# Patient Record
Sex: MALE | Race: WHITE | NOT HISPANIC OR LATINO | Employment: UNEMPLOYED | URBAN - METROPOLITAN AREA
[De-identification: names, ages, dates, MRNs, and addresses within clinical notes are randomized per-mention and may not be internally consistent; named-entity substitution may affect disease eponyms.]

---

## 2023-10-17 DIAGNOSIS — I12.9 BENIGN HYPERTENSION WITH CHRONIC KIDNEY DISEASE: Primary | ICD-10-CM

## 2023-10-17 PROBLEM — I10 ESSENTIAL HYPERTENSION: Status: ACTIVE | Noted: 2023-10-17

## 2023-10-17 PROBLEM — K21.9 GASTRO-ESOPHAGEAL REFLUX DISEASE WITHOUT ESOPHAGITIS: Status: ACTIVE | Noted: 2023-10-17

## 2023-10-17 PROBLEM — Z86.39 HISTORY OF UNCONTROLLED DIABETES: Status: ACTIVE | Noted: 2023-10-17

## 2023-10-17 PROBLEM — E11.9 TYPE 2 DIABETES MELLITUS WITHOUT COMPLICATION (MULTI): Status: ACTIVE | Noted: 2023-10-17

## 2023-10-17 PROBLEM — B02.29 POSTHERPETIC NEURALGIA: Status: ACTIVE | Noted: 2023-10-17

## 2023-10-17 PROBLEM — I48.92 ATRIAL FLUTTER (MULTI): Status: ACTIVE | Noted: 2023-10-17

## 2023-10-17 PROBLEM — I25.10 ATHEROSCLEROTIC HEART DISEASE OF NATIVE CORONARY ARTERY WITHOUT ANGINA PECTORIS: Status: ACTIVE | Noted: 2023-10-17

## 2023-10-17 PROBLEM — E78.5 HYPERLIPIDEMIA: Status: ACTIVE | Noted: 2023-10-17

## 2023-10-17 RX ORDER — LOSARTAN POTASSIUM 100 MG/1
100 TABLET ORAL DAILY
COMMUNITY
End: 2023-10-30 | Stop reason: SDUPTHER

## 2023-10-17 RX ORDER — PANTOPRAZOLE SODIUM 20 MG/1
20 TABLET, DELAYED RELEASE ORAL DAILY
COMMUNITY
End: 2023-11-08

## 2023-10-17 RX ORDER — DULOXETIN HYDROCHLORIDE 60 MG/1
60 CAPSULE, DELAYED RELEASE ORAL
COMMUNITY
End: 2023-10-30 | Stop reason: SDUPTHER

## 2023-10-17 RX ORDER — POTASSIUM CHLORIDE 20 MEQ/1
20 TABLET, EXTENDED RELEASE ORAL 2 TIMES DAILY
COMMUNITY
End: 2024-03-22 | Stop reason: SDUPTHER

## 2023-10-17 RX ORDER — CHLORTHALIDONE 25 MG/1
25 TABLET ORAL EVERY MORNING
COMMUNITY
End: 2023-10-17 | Stop reason: SDUPTHER

## 2023-10-17 RX ORDER — DULOXETIN HYDROCHLORIDE 30 MG/1
30 CAPSULE, DELAYED RELEASE ORAL
COMMUNITY
End: 2023-10-30 | Stop reason: SDUPTHER

## 2023-10-17 RX ORDER — PIOGLITAZONEHYDROCHLORIDE 15 MG/1
15 TABLET ORAL DAILY
COMMUNITY
End: 2023-11-08

## 2023-10-17 RX ORDER — BUPROPION HYDROCHLORIDE 150 MG/1
150 TABLET ORAL DAILY
COMMUNITY
End: 2023-12-26 | Stop reason: SDUPTHER

## 2023-10-17 RX ORDER — FUROSEMIDE 20 MG/1
20 TABLET ORAL DAILY
COMMUNITY
End: 2024-02-28

## 2023-10-17 RX ORDER — ACETAMINOPHEN 500 MG
1000 TABLET ORAL DAILY
COMMUNITY

## 2023-10-17 RX ORDER — METOPROLOL TARTRATE 100 MG/1
100 TABLET ORAL 2 TIMES DAILY
COMMUNITY
End: 2023-11-08

## 2023-10-17 RX ORDER — NYSTATIN 100000 U/G
1 CREAM TOPICAL 2 TIMES DAILY PRN
COMMUNITY
Start: 2022-08-10

## 2023-10-17 RX ORDER — ATORVASTATIN CALCIUM 80 MG/1
80 TABLET, FILM COATED ORAL DAILY
COMMUNITY

## 2023-10-17 RX ORDER — NYSTATIN 100000 [USP'U]/G
1 POWDER TOPICAL 2 TIMES DAILY
COMMUNITY
Start: 2022-08-10

## 2023-10-19 RX ORDER — CHLORTHALIDONE 25 MG/1
25 TABLET ORAL EVERY MORNING
Qty: 90 TABLET | Refills: 3 | Status: SHIPPED | OUTPATIENT
Start: 2023-10-19

## 2023-10-20 ENCOUNTER — TELEPHONE (OUTPATIENT)
Dept: PRIMARY CARE | Facility: CLINIC | Age: 73
End: 2023-10-20
Payer: MEDICARE

## 2023-10-20 PROBLEM — R60.9 EDEMA: Status: ACTIVE | Noted: 2023-10-20

## 2023-10-20 PROBLEM — R29.6 RECURRENT FALLS: Status: ACTIVE | Noted: 2023-10-20

## 2023-10-20 PROBLEM — R06.00 DYSPNEA: Status: ACTIVE | Noted: 2023-10-20

## 2023-10-20 PROBLEM — Z86.2 HISTORY OF IRON DEFICIENCY ANEMIA: Status: ACTIVE | Noted: 2023-10-20

## 2023-10-20 PROBLEM — I82.90 VENOUS THROMBOSIS: Status: ACTIVE | Noted: 2023-10-20

## 2023-10-20 PROBLEM — I65.29 CAROTID ARTERY OCCLUSION: Status: ACTIVE | Noted: 2023-10-20

## 2023-10-20 PROBLEM — K57.90 DIVERTICULOSIS OF INTESTINE WITHOUT PERFORATION OR ABSCESS WITHOUT BLEEDING: Status: ACTIVE | Noted: 2023-10-20

## 2023-10-20 PROBLEM — R06.89 IMPAIRED GAS EXCHANGE: Status: ACTIVE | Noted: 2023-10-20

## 2023-10-20 PROBLEM — I50.9 CONGESTIVE HEART FAILURE (MULTI): Status: ACTIVE | Noted: 2023-10-20

## 2023-10-20 PROBLEM — D64.9 ANEMIA: Status: ACTIVE | Noted: 2023-10-20

## 2023-10-20 PROBLEM — I13.0: Status: ACTIVE | Noted: 2023-10-20

## 2023-10-20 PROBLEM — R07.9 CHEST PAIN: Status: ACTIVE | Noted: 2023-10-20

## 2023-10-20 PROBLEM — R68.89 ACTIVITY INTOLERANCE: Status: ACTIVE | Noted: 2023-10-20

## 2023-10-20 PROBLEM — Q20.8 ABNORMALITY OF LEFT ATRIAL APPENDAGE (HHS-HCC): Status: ACTIVE | Noted: 2023-10-20

## 2023-10-20 PROBLEM — G89.29 CHRONIC PAIN: Status: ACTIVE | Noted: 2023-10-20

## 2023-10-20 PROBLEM — E63.8 NUTRITIONAL INTAKE LESS THAN BODY REQUIREMENTS: Status: ACTIVE | Noted: 2023-10-20

## 2023-10-20 PROBLEM — K92.2 LOWER GASTROINTESTINAL HEMORRHAGE: Status: ACTIVE | Noted: 2023-10-20

## 2023-10-20 PROBLEM — F32.9 MAJOR DEPRESSIVE DISORDER, SINGLE EPISODE, UNSPECIFIED: Status: ACTIVE | Noted: 2023-10-20

## 2023-10-20 PROBLEM — E78.5 DYSLIPIDEMIA: Status: ACTIVE | Noted: 2023-10-20

## 2023-10-20 PROBLEM — E87.8 FLUID VOLUME DISORDER: Status: ACTIVE | Noted: 2023-10-20

## 2023-10-23 ENCOUNTER — OFFICE VISIT (OUTPATIENT)
Dept: PRIMARY CARE | Facility: CLINIC | Age: 73
End: 2023-10-23
Payer: MEDICARE

## 2023-10-23 VITALS
TEMPERATURE: 97.3 F | HEART RATE: 66 BPM | WEIGHT: 267.86 LBS | OXYGEN SATURATION: 97 % | RESPIRATION RATE: 18 BRPM | BODY MASS INDEX: 33.31 KG/M2 | SYSTOLIC BLOOD PRESSURE: 148 MMHG | DIASTOLIC BLOOD PRESSURE: 84 MMHG | HEIGHT: 75 IN

## 2023-10-23 DIAGNOSIS — G89.29 OTHER CHRONIC PAIN: ICD-10-CM

## 2023-10-23 DIAGNOSIS — E78.2 MIXED HYPERLIPIDEMIA: ICD-10-CM

## 2023-10-23 DIAGNOSIS — I50.9 CHRONIC CONGESTIVE HEART FAILURE, UNSPECIFIED HEART FAILURE TYPE (MULTI): ICD-10-CM

## 2023-10-23 DIAGNOSIS — E11.9 TYPE 2 DIABETES MELLITUS WITHOUT COMPLICATION, WITHOUT LONG-TERM CURRENT USE OF INSULIN (MULTI): ICD-10-CM

## 2023-10-23 DIAGNOSIS — K21.9 GASTRO-ESOPHAGEAL REFLUX DISEASE WITHOUT ESOPHAGITIS: ICD-10-CM

## 2023-10-23 DIAGNOSIS — F33.1 MODERATE EPISODE OF RECURRENT MAJOR DEPRESSIVE DISORDER (MULTI): ICD-10-CM

## 2023-10-23 DIAGNOSIS — I48.3 TYPICAL ATRIAL FLUTTER (MULTI): ICD-10-CM

## 2023-10-23 DIAGNOSIS — I10 ESSENTIAL HYPERTENSION: Primary | ICD-10-CM

## 2023-10-23 PROCEDURE — 3051F HG A1C>EQUAL 7.0%<8.0%: CPT | Performed by: NURSE PRACTITIONER

## 2023-10-23 PROCEDURE — 1159F MED LIST DOCD IN RCRD: CPT | Performed by: NURSE PRACTITIONER

## 2023-10-23 PROCEDURE — 1126F AMNT PAIN NOTED NONE PRSNT: CPT | Performed by: NURSE PRACTITIONER

## 2023-10-23 PROCEDURE — 4010F ACE/ARB THERAPY RXD/TAKEN: CPT | Performed by: NURSE PRACTITIONER

## 2023-10-23 PROCEDURE — 99349 HOME/RES VST EST MOD MDM 40: CPT | Performed by: NURSE PRACTITIONER

## 2023-10-23 PROCEDURE — 1160F RVW MEDS BY RX/DR IN RCRD: CPT | Performed by: NURSE PRACTITIONER

## 2023-10-23 PROCEDURE — 3077F SYST BP >= 140 MM HG: CPT | Performed by: NURSE PRACTITIONER

## 2023-10-23 PROCEDURE — 3079F DIAST BP 80-89 MM HG: CPT | Performed by: NURSE PRACTITIONER

## 2023-10-23 ASSESSMENT — ENCOUNTER SYMPTOMS
WHEEZING: 0
ARTHRALGIAS: 1
DIZZINESS: 0
LIGHT-HEADEDNESS: 0
DIFFICULTY URINATING: 0
BRUISES/BLEEDS EASILY: 0
CHILLS: 0
FEVER: 0
DEPRESSION: 0
ABDOMINAL PAIN: 0
ENDOCRINE COMMENTS: POSITIVE FOR DIABETES
NAUSEA: 0
LOSS OF SENSATION IN FEET: 0
UNEXPECTED WEIGHT CHANGE: 0
TROUBLE SWALLOWING: 0
CONSTIPATION: 0
SHORTNESS OF BREATH: 1
COUGH: 0
VOMITING: 0
DIARRHEA: 0
OCCASIONAL FEELINGS OF UNSTEADINESS: 0
PALPITATIONS: 0
APPETITE CHANGE: 0

## 2023-10-23 ASSESSMENT — PAIN SCALES - GENERAL: PAINLEVEL: 0-NO PAIN

## 2023-10-23 NOTE — PROGRESS NOTES
"Subjective   Patient ID: Bhupinder Cortez is a 73 y.o. male who presents for Follow-up (Chronic medical conditions ).    Visit for 72 y/o male seen today in private home, alone for routine follow up of chronic medical conditions. Patient was standing at doorway upon provider arrival. He was able to ambulate into his kitchen without difficulty. He is alert, oriented, able to answer all questions regarding his health. He lives with his son Bhupinder and daughter in law Nathalia. He does not drive or have a vehicle. His PCP is Dr. Esparza but patient has difficulty leaving the house. He was working with therapy services and his mobility did improve where he is able to go up and down the steps in the home but he does not typically go down the porch steps due to how steep they are. He has had 2 falls since his last house calls follow up 2 months ago. He denies head injury or LOC. Denies pain or discomfort post fall. Patient does not use assistive device. He leaves the house once per month to go to the bank with his son. His son goes camping frequently in the summer on the weekends but patient prefers not to go because he does not like mosquitos. Patient is able to manage his own medications. His son monitors the medications and will call the refills into the office. Patient performs all ADLs without difficulty. His family prepares all the meals in the home. Patient denies appetite changes or weight loss. He denies abdominal pain, nausea, vomiting. Denies bowel or bladder concerns. H/o DM. Continues on Actos daily. He tries to limit his carb intake. He has been avoiding toast in the mornings. He does not monitor his glucose levels routinely. Denies any signs or symptoms of hypoglycemia. Denies dizziness, lightheadedness. H/o chronic pain. He will occasionally have aches and pains, mostly \"arthritis\" related and will take tylenol extra strength with improvement. H/o HTN. Does not routinely monitor his BP. Denies headaches, " dizziness, blurry vision, chest pain, palpitations. H/o CHF. Does not routinely monitor his weight. Admits to shortness of breath with exertion. Denies feeling short of breath at rest. Denies issues with edema. Denies difficulty sleeping. No recent hospitalizations.     Home Visit:          Medically necessary due to: patient has limited support systems to help the patient attend office visits, Illness or condition that results in activity lmitation or restriction that impacts the ability to leave home such as:, unsteady gait/poor balance.        Current Outpatient Medications:   •  acetaminophen (Tylenol) 500 mg tablet, Take 2 tablets (1,000 mg) by mouth once daily., Disp: , Rfl:   •  apixaban (Eliquis) 5 mg tablet, TAKE ONE (1) TABLET BY MOUTH TWICE DAILY WITH OR WITHOUT FOOD., Disp: 60 tablet, Rfl: 5  •  atorvastatin (Lipitor) 80 mg tablet, Take 1 tablet (80 mg) by mouth once daily., Disp: , Rfl:   •  buPROPion XL (Wellbutrin XL) 150 mg 24 hr tablet, Take 1 tablet (150 mg) by mouth once daily., Disp: , Rfl:   •  chlorthalidone (Hygroton) 25 mg tablet, Take 1 tablet (25 mg) by mouth once daily in the morning., Disp: 90 tablet, Rfl: 3  •  DULoxetine (Cymbalta) 30 mg DR capsule, 1 capsule (30 mg). TAKE 1 CAPSULE BY MOUTH EVERY DAY WITH 60 MG CAPSULE, Disp: , Rfl:   •  DULoxetine (Cymbalta) 60 mg DR capsule, 1 capsule (60 mg). TAKE 1 CAPSULE BY MOUTH EVERY DAY WITH 30MG CAPSULE, Disp: , Rfl:   •  furosemide (Lasix) 20 mg tablet, Take 1 tablet (20 mg) by mouth once daily., Disp: , Rfl:   •  losartan (Cozaar) 100 mg tablet, Take 1 tablet (100 mg) by mouth once daily., Disp: , Rfl:   •  metoprolol tartrate (Lopressor) 100 mg tablet, Take 1 tablet (100 mg) by mouth 2 times a day., Disp: , Rfl:   •  multivitamin with minerals iron-free (multivit-iron-minerals-folic acid), Take 1 tablet by mouth once daily., Disp: , Rfl:   •  nystatin (Mycostatin) 100,000 unit/gram powder, Apply 1 Application topically 2 times a day.  "Externally to left axilla as needed, Disp: , Rfl:   •  nystatin (Mycostatin) cream, Apply 1 Application topically 2 times a day as needed., Disp: , Rfl:   •  pantoprazole (ProtoNix) 20 mg EC tablet, Take 1 tablet (20 mg) by mouth once daily., Disp: , Rfl:   •  pioglitazone (Actos) 15 mg tablet, Take 1 tablet (15 mg) by mouth once daily., Disp: , Rfl:   •  potassium chloride CR 20 mEq ER tablet, Take 1 tablet (20 mEq) by mouth 2 times a day., Disp: , Rfl:      Review of Systems   Constitutional:  Negative for appetite change, chills, fever and unexpected weight change.   HENT:  Negative for trouble swallowing.    Respiratory:  Positive for shortness of breath (on exertion). Negative for cough and wheezing.    Cardiovascular:  Negative for chest pain and palpitations.   Gastrointestinal:  Negative for abdominal pain, constipation, diarrhea, nausea and vomiting.   Endocrine:        Positive for diabetes    Genitourinary:  Negative for difficulty urinating.   Musculoskeletal:  Positive for arthralgias and gait problem (balance difficulty).   Neurological:  Negative for dizziness and light-headedness.   Hematological:  Does not bruise/bleed easily.   Psychiatric/Behavioral:          Positive for depression      Objective   /84 (BP Location: Left arm, Patient Position: Sitting, BP Cuff Size: Adult)   Pulse 66   Temp 36.3 °C (97.3 °F) (Temporal)   Resp 18   Ht 1.905 m (6' 3\")   Wt 121 kg (267 lb 13.7 oz)   SpO2 97%   BMI 33.48 kg/m²     Physical Exam  Constitutional:       General: He is not in acute distress.     Appearance: Normal appearance.   HENT:      Head: Normocephalic and atraumatic.      Nose: Nose normal.      Mouth/Throat:      Mouth: Mucous membranes are moist.      Pharynx: Oropharynx is clear.   Eyes:      Extraocular Movements: Extraocular movements intact.      Pupils: Pupils are equal, round, and reactive to light.   Cardiovascular:      Rate and Rhythm: Normal rate and regular rhythm.      " Pulses: Normal pulses.      Heart sounds: Normal heart sounds. No murmur heard.     No friction rub. No gallop.   Pulmonary:      Effort: Pulmonary effort is normal. No respiratory distress.      Breath sounds: Normal breath sounds.   Abdominal:      General: Bowel sounds are normal. There is no distension.      Palpations: Abdomen is soft.      Tenderness: There is no abdominal tenderness.   Musculoskeletal:      Cervical back: Neck supple.      Right lower leg: No edema.      Left lower leg: No edema.   Skin:     General: Skin is warm and dry.   Neurological:      General: No focal deficit present.      Mental Status: He is alert and oriented to person, place, and time.   Psychiatric:         Mood and Affect: Mood normal.         Behavior: Behavior normal.     Assessment/Plan   Diagnoses and all orders for this visit:  Essential hypertension  Comments:  chronic, BP slightly high on exam today. Pt is mildly agitated regarding JFS issue that he is discussing. Continue Losartan, Chlorthalidone  Chronic congestive heart failure, unspecified heart failure type (CMS/HCC)  Comments:  chronic, stable, euvolemic on exam, continue Furosemide, Potassium  Typical atrial flutter (CMS/HCC)  Comments:  chronic, HR stable, continue Eliqis, Metoprolol  Mixed hyperlipidemia  Comments:  chronic, stable, continue Atorvastatin  Type 2 diabetes mellitus without complication, without long-term current use of insulin (CMS/HCC)  Comments:  chronic, stable, continue Actos  Gastro-esophageal reflux disease without esophagitis  Comments:  chronic, stable, continue Protonix  Other chronic pain  Comments:  chronic, intermittent back pain, continue Tylenol  Moderate episode of recurrent major depressive disorder (CMS/HCC)  Comments:  chronic, mood overall stable, continue Duloxetine, Wellbutrin    Patient stable. Seems to be doing well overall. Will continue house calls NP program due to limited mobility, difficulty getting out for medical  appointments. Will follow up with pt in 2 months with plans to update routine labs.        Keyla Nuñez, DIANA-CNP

## 2023-10-30 DIAGNOSIS — F32.A DEPRESSIVE DISORDER: ICD-10-CM

## 2023-10-30 DIAGNOSIS — I48.92 ATRIAL FLUTTER, UNSPECIFIED TYPE (MULTI): ICD-10-CM

## 2023-10-30 DIAGNOSIS — I10 ESSENTIAL HYPERTENSION: ICD-10-CM

## 2023-10-30 RX ORDER — DULOXETIN HYDROCHLORIDE 60 MG/1
60 CAPSULE, DELAYED RELEASE ORAL DAILY
Qty: 90 CAPSULE | Refills: 3 | Status: SHIPPED | OUTPATIENT
Start: 2023-10-30

## 2023-10-30 RX ORDER — LOSARTAN POTASSIUM 100 MG/1
100 TABLET ORAL DAILY
Qty: 90 TABLET | Refills: 3 | Status: SHIPPED | OUTPATIENT
Start: 2023-10-30

## 2023-10-30 RX ORDER — DULOXETIN HYDROCHLORIDE 30 MG/1
30 CAPSULE, DELAYED RELEASE ORAL DAILY
Qty: 90 CAPSULE | Refills: 3 | Status: SHIPPED | OUTPATIENT
Start: 2023-10-30

## 2023-11-08 DIAGNOSIS — K21.9 GASTRO-ESOPHAGEAL REFLUX DISEASE WITHOUT ESOPHAGITIS: ICD-10-CM

## 2023-11-08 DIAGNOSIS — I10 ESSENTIAL (PRIMARY) HYPERTENSION: ICD-10-CM

## 2023-11-08 DIAGNOSIS — E11.65 TYPE 2 DIABETES MELLITUS WITH HYPERGLYCEMIA (MULTI): ICD-10-CM

## 2023-11-08 RX ORDER — METOPROLOL TARTRATE 100 MG/1
100 TABLET ORAL 2 TIMES DAILY
Qty: 180 TABLET | Refills: 3 | Status: SHIPPED | OUTPATIENT
Start: 2023-11-08

## 2023-11-08 RX ORDER — PANTOPRAZOLE SODIUM 20 MG/1
20 TABLET, DELAYED RELEASE ORAL DAILY
Qty: 90 TABLET | Refills: 3 | Status: SHIPPED | OUTPATIENT
Start: 2023-11-08

## 2023-11-08 RX ORDER — PIOGLITAZONEHYDROCHLORIDE 15 MG/1
15 TABLET ORAL DAILY
Qty: 90 TABLET | Refills: 3 | Status: SHIPPED | OUTPATIENT
Start: 2023-11-08

## 2023-12-11 ENCOUNTER — TELEPHONE (OUTPATIENT)
Dept: PRIMARY CARE | Facility: CLINIC | Age: 73
End: 2023-12-11
Payer: MEDICARE

## 2023-12-12 ENCOUNTER — LAB (OUTPATIENT)
Dept: LAB | Facility: LAB | Age: 73
End: 2023-12-12
Payer: MEDICARE

## 2023-12-12 ENCOUNTER — OFFICE VISIT (OUTPATIENT)
Dept: PRIMARY CARE | Facility: CLINIC | Age: 73
End: 2023-12-12
Payer: MEDICARE

## 2023-12-12 VITALS
RESPIRATION RATE: 18 BRPM | WEIGHT: 272.27 LBS | OXYGEN SATURATION: 98 % | SYSTOLIC BLOOD PRESSURE: 148 MMHG | HEIGHT: 75 IN | BODY MASS INDEX: 33.85 KG/M2 | HEART RATE: 79 BPM | DIASTOLIC BLOOD PRESSURE: 80 MMHG | TEMPERATURE: 97.3 F

## 2023-12-12 DIAGNOSIS — F32.A DEPRESSIVE DISORDER: ICD-10-CM

## 2023-12-12 DIAGNOSIS — I10 ESSENTIAL HYPERTENSION: Primary | ICD-10-CM

## 2023-12-12 DIAGNOSIS — K59.09 OTHER CONSTIPATION: ICD-10-CM

## 2023-12-12 DIAGNOSIS — G89.29 OTHER CHRONIC PAIN: ICD-10-CM

## 2023-12-12 DIAGNOSIS — N18.31 STAGE 3A CHRONIC KIDNEY DISEASE (MULTI): ICD-10-CM

## 2023-12-12 DIAGNOSIS — E11.9 TYPE 2 DIABETES MELLITUS WITHOUT COMPLICATION, WITHOUT LONG-TERM CURRENT USE OF INSULIN (MULTI): ICD-10-CM

## 2023-12-12 DIAGNOSIS — I25.10 ATHEROSCLEROSIS OF NATIVE CORONARY ARTERY OF NATIVE HEART WITHOUT ANGINA PECTORIS: ICD-10-CM

## 2023-12-12 DIAGNOSIS — K21.9 GASTRO-ESOPHAGEAL REFLUX DISEASE WITHOUT ESOPHAGITIS: ICD-10-CM

## 2023-12-12 DIAGNOSIS — E78.1 HYPERTRIGLYCERIDEMIA: ICD-10-CM

## 2023-12-12 DIAGNOSIS — I48.3 TYPICAL ATRIAL FLUTTER (MULTI): ICD-10-CM

## 2023-12-12 DIAGNOSIS — I50.9 CHRONIC CONGESTIVE HEART FAILURE, UNSPECIFIED HEART FAILURE TYPE (MULTI): ICD-10-CM

## 2023-12-12 LAB
ALBUMIN SERPL BCP-MCNC: 4.1 G/DL (ref 3.4–5)
ALP SERPL-CCNC: 97 U/L (ref 33–136)
ALT SERPL W P-5'-P-CCNC: 16 U/L (ref 10–52)
ANION GAP SERPL CALC-SCNC: 12 MMOL/L (ref 10–20)
AST SERPL W P-5'-P-CCNC: 13 U/L (ref 9–39)
BILIRUB SERPL-MCNC: 0.8 MG/DL (ref 0–1.2)
BUN SERPL-MCNC: 27 MG/DL (ref 6–23)
CALCIUM SERPL-MCNC: 9.6 MG/DL (ref 8.6–10.3)
CHLORIDE SERPL-SCNC: 101 MMOL/L (ref 98–107)
CHOLEST SERPL-MCNC: 155 MG/DL (ref 0–199)
CHOLESTEROL/HDL RATIO: 3.8
CO2 SERPL-SCNC: 31 MMOL/L (ref 21–32)
CREAT SERPL-MCNC: 1.39 MG/DL (ref 0.5–1.3)
ERYTHROCYTE [DISTWIDTH] IN BLOOD BY AUTOMATED COUNT: 13.4 % (ref 11.5–14.5)
GFR SERPL CREATININE-BSD FRML MDRD: 53 ML/MIN/1.73M*2
GLUCOSE SERPL-MCNC: 197 MG/DL (ref 74–99)
HCT VFR BLD AUTO: 43.8 % (ref 41–52)
HDLC SERPL-MCNC: 40.6 MG/DL
HGB BLD-MCNC: 13.8 G/DL (ref 13.5–17.5)
LDLC SERPL CALC-MCNC: 48 MG/DL
MCH RBC QN AUTO: 29.4 PG (ref 26–34)
MCHC RBC AUTO-ENTMCNC: 31.5 G/DL (ref 32–36)
MCV RBC AUTO: 93 FL (ref 80–100)
NON HDL CHOLESTEROL: 114 MG/DL (ref 0–149)
NRBC BLD-RTO: 0 /100 WBCS (ref 0–0)
PLATELET # BLD AUTO: 168 X10*3/UL (ref 150–450)
POTASSIUM SERPL-SCNC: 3.6 MMOL/L (ref 3.5–5.3)
PROT SERPL-MCNC: 6.4 G/DL (ref 6.4–8.2)
RBC # BLD AUTO: 4.7 X10*6/UL (ref 4.5–5.9)
SODIUM SERPL-SCNC: 140 MMOL/L (ref 136–145)
TRIGL SERPL-MCNC: 331 MG/DL (ref 0–149)
VLDL: 66 MG/DL (ref 0–40)
WBC # BLD AUTO: 6.2 X10*3/UL (ref 4.4–11.3)

## 2023-12-12 PROCEDURE — 1126F AMNT PAIN NOTED NONE PRSNT: CPT | Performed by: NURSE PRACTITIONER

## 2023-12-12 PROCEDURE — 82043 UR ALBUMIN QUANTITATIVE: CPT | Performed by: NURSE PRACTITIONER

## 2023-12-12 PROCEDURE — 1036F TOBACCO NON-USER: CPT | Performed by: NURSE PRACTITIONER

## 2023-12-12 PROCEDURE — 83036 HEMOGLOBIN GLYCOSYLATED A1C: CPT

## 2023-12-12 PROCEDURE — 3079F DIAST BP 80-89 MM HG: CPT | Performed by: NURSE PRACTITIONER

## 2023-12-12 PROCEDURE — 3062F POS MACROALBUMINURIA REV: CPT | Performed by: NURSE PRACTITIONER

## 2023-12-12 PROCEDURE — 4010F ACE/ARB THERAPY RXD/TAKEN: CPT | Performed by: NURSE PRACTITIONER

## 2023-12-12 PROCEDURE — 36415 COLL VENOUS BLD VENIPUNCTURE: CPT | Performed by: NURSE PRACTITIONER

## 2023-12-12 PROCEDURE — 1159F MED LIST DOCD IN RCRD: CPT | Performed by: NURSE PRACTITIONER

## 2023-12-12 PROCEDURE — 99349 HOME/RES VST EST MOD MDM 40: CPT | Performed by: NURSE PRACTITIONER

## 2023-12-12 PROCEDURE — 3046F HEMOGLOBIN A1C LEVEL >9.0%: CPT | Performed by: NURSE PRACTITIONER

## 2023-12-12 PROCEDURE — 1160F RVW MEDS BY RX/DR IN RCRD: CPT | Performed by: NURSE PRACTITIONER

## 2023-12-12 PROCEDURE — 3048F LDL-C <100 MG/DL: CPT | Performed by: NURSE PRACTITIONER

## 2023-12-12 PROCEDURE — 3077F SYST BP >= 140 MM HG: CPT | Performed by: NURSE PRACTITIONER

## 2023-12-12 PROCEDURE — 36415 COLL VENOUS BLD VENIPUNCTURE: CPT

## 2023-12-12 ASSESSMENT — PAIN SCALES - GENERAL: PAINLEVEL: 0-NO PAIN

## 2023-12-12 NOTE — PROGRESS NOTES
Subjective   Patient ID: Bhupinder Cortez is a 73 y.o. male who presents for Follow-up (HTN, CHF, DM, Labs).    Visit for 74 y/o male seen today in private home, alone for routine follow. Patient is sitting at dining room table this morning. He is alert, oriented, able to answer all questions regarding his health. He lives with his son Bhupinder and daughter in law Nathalia. He does not drive or have a vehicle. He has limited mobility making it difficult to get out to see PCP Dr. Esparza. Patient remains ambulatory. He leaves the house once per month to go to the [employer] with his son. He does not use assistive device in the house but will use a walker if going outside. He denies any falls since his last house calls follow up. Patient is able to manage his own medications. He admits that he will forget to take his medications at times and then gets worried to take the pills because he does not want to double dose himself. He recently started using post it notes to remind himself if he took the pills. His son is also helping to keep track of the medications. Patient is able to do his own dressing, bathing and hygiene. He requires help with meal preparation. Denies any appetite changes or signs of weight loss. Denies abdominal pain, nausea, vomiting. Admits to recent issues with constipation. Reports that this started about 2-3 weeks ago. He did have an episode were he was straining to have a BM and he got dizzy. He has not had any issues since. Denies rectal bleeding, dizziness or lightheadedness. He has started taking a daily Dulcolax supplement which has helped.     DM- patient reports increased thirst for the last month. Reports that this started after switching over the AC to heat. He is unsure if its related to dry air in the home. He does not have a glucometer to check his glucose levels as he declined to do this in the past due to difficulty with the machine. He continues on Actos daily. He tries to limit his carb  "intake.     H/o chronic pain. He will occasionally have aches and pains, mostly \"arthritis\" related and will take tylenol extra strength with improvement.    HTN- patient does not see cardiology. He does not routinely monitor his BP. He denies headaches, dizziness, blurry vision, chest pain, palpitations.    CHF- patient admits to shortness of breath with exertion. Denies feeling short of breath at rest. Denies issues with edema. He has gained a few lbs since last follow up but believes this is diet related.     Home Visit:          Medically necessary due to: patient has limited support systems to help the patient attend office visits, Illness or condition that results in activity lmitation or restriction that impacts the ability to leave home such as:, unsteady gait/poor balance.         Current Outpatient Medications:     acetaminophen (Tylenol) 500 mg tablet, Take 2 tablets (1,000 mg) by mouth once daily., Disp: , Rfl:     apixaban (Eliquis) 5 mg tablet, TAKE ONE (1) TABLET BY MOUTH TWICE DAILY WITH OR WITHOUT FOOD., Disp: 60 tablet, Rfl: 11    atorvastatin (Lipitor) 80 mg tablet, Take 1 tablet (80 mg) by mouth once daily., Disp: , Rfl:     buPROPion XL (Wellbutrin XL) 150 mg 24 hr tablet, Take 1 tablet (150 mg) by mouth once daily., Disp: , Rfl:     chlorthalidone (Hygroton) 25 mg tablet, Take 1 tablet (25 mg) by mouth once daily in the morning., Disp: 90 tablet, Rfl: 3    DULoxetine (Cymbalta) 30 mg DR capsule, Take 1 capsule (30 mg) by mouth once daily. TAKE 1 CAPSULE BY MOUTH EVERY DAY WITH 60 MG CAPSULE, Disp: 90 capsule, Rfl: 3    DULoxetine (Cymbalta) 60 mg DR capsule, Take 1 capsule (60 mg) by mouth once daily. TAKE 1 CAPSULE BY MOUTH EVERY DAY WITH 30MG CAPSULE, Disp: 90 capsule, Rfl: 3    fish oil (Omega-3) 60- mg capsule, Take 1 capsule (500 mg) by mouth once daily., Disp: 30 capsule, Rfl: 11    furosemide (Lasix) 20 mg tablet, Take 1 tablet (20 mg) by mouth once daily., Disp: , Rfl:     " "losartan (Cozaar) 100 mg tablet, Take 1 tablet (100 mg) by mouth once daily., Disp: 90 tablet, Rfl: 3    metoprolol tartrate (Lopressor) 100 mg tablet, Take 1 tablet (100 mg) by mouth 2 times a day., Disp: 180 tablet, Rfl: 3    multivitamin with minerals iron-free (multivit-iron-minerals-folic acid), Take 1 tablet by mouth once daily., Disp: , Rfl:     nystatin (Mycostatin) 100,000 unit/gram powder, Apply 1 Application topically 2 times a day. Externally to left axilla as needed, Disp: , Rfl:     nystatin (Mycostatin) cream, Apply 1 Application topically 2 times a day as needed., Disp: , Rfl:     pantoprazole (ProtoNix) 20 mg EC tablet, Take 1 tablet (20 mg) by mouth once daily., Disp: 90 tablet, Rfl: 3    pioglitazone (Actos) 15 mg tablet, Take 1 tablet (15 mg) by mouth once daily., Disp: 90 tablet, Rfl: 3    potassium chloride CR 20 mEq ER tablet, Take 1 tablet (20 mEq) by mouth 2 times a day., Disp: , Rfl:      Review of Systems  Constitutional:  Negative for appetite change, chills, fever and unexpected weight change.   HENT:  Negative for trouble swallowing.    Respiratory:  Positive for shortness of breath on exertion. Negative for cough and wheezing.    Cardiovascular:  Negative for chest pain and palpitations.   Gastrointestinal: Positive for constipation. Negative for abdominal pain, diarrhea, nausea and vomiting.   Endocrine: Positive for diabetes, increased thirst   Genitourinary:  Negative for difficulty urinating.   Musculoskeletal:  Positive for arthralgias, unsteady gait, difficulty with balance  Neurological:  Negative for dizziness and light-headedness.   Hematological:  Does not bruise/bleed easily.   Psychiatric/Behavioral: Positive for depression    Objective   /80 (BP Location: Left arm, Patient Position: Sitting, BP Cuff Size: Large adult)   Pulse 79   Temp 36.3 °C (97.3 °F) (Temporal)   Resp 18   Ht 1.905 m (6' 3\")   Wt 124 kg (272 lb 4.3 oz)   SpO2 98%   BMI 34.03 kg/m² "     Physical Exam  Constitutional:       General: Alert, sitting at dining room table. He is not in acute distress.     Appearance: Normal appearance.   HENT:      Head: Normocephalic and atraumatic.      Nose: Nose normal.      Mouth/Throat:      Mouth: Mucous membranes are moist.      Pharynx: Oropharynx is clear.   Eyes:      Extraocular Movements: Extraocular movements intact.      Pupils: Pupils are equal, round, and reactive to light.   Cardiovascular:      Rate and Rhythm: Normal rate and regular rhythm.      Pulses: Normal pulses.      Heart sounds: Normal heart sounds. No murmur heard.     No friction rub. No gallop.   Pulmonary:      Effort: Pulmonary effort is normal. No respiratory distress.      Breath sounds: Normal breath sounds.   Abdominal:      General: Bowel sounds are normal. There is no distension.      Palpations: Abdomen is soft.      Tenderness: There is no abdominal tenderness.   Musculoskeletal:      Cervical back: Neck supple.      Right lower leg: No edema.      Left lower leg: No edema.   Skin:     General: Skin is warm and dry.   Neurological:      General: No focal deficit present.      Mental Status: He is alert and oriented to person, place, and time.   Psychiatric:         Mood and Affect: Mood normal.         Behavior: Behavior normal.     Assessment/Plan   Diagnoses and all orders for this visit:  Essential hypertension  Comments:  chronic, BP slightly elevated, does admit to some non compliance with meds. Continue Losartan, Metoprolol, Chlorthalidone  Orders:  -     CBC  -     Comprehensive metabolic panel  Typical atrial flutter (CMS/HCC)  Comments:  chronic, HR stable, regular on exam. Continue Metoprolol, Eliquis  Chronic congestive heart failure, unspecified heart failure type (CMS/HCC)  Comments:  chronic, stable, euvolemic on exam, continue Furosemide, Potassium  Atherosclerosis of native coronary artery of native heart without angina pectoris  Comments:  chronic, stable,  no reports of angina, continue Atorvastatin  Orders:  -     Lipid panel  Type 2 diabetes mellitus without complication, without long-term current use of insulin (CMS/[facility])  Comments:  chronic, reports increased thirst today, will check A1c. Continue Actos  Orders:  -     Albumin, urine, random  -     Hemoglobin A1c  Gastro-esophageal reflux disease without esophagitis  Comments:  chronic, stable, continue Pantoprazole  Other constipation  Comments:  acute, continue with daily stool softener  Stage 3a chronic kidney disease (CMS/[facility])  Comments:  chronic, will check renal function today  Orders:  -     Comprehensive metabolic panel  Other chronic pain  Comments:  chronic, mostly arthritic pains, continue tylenol routinely  Hypertriglyceridemia  -     fish oil (Omega-3) 60- mg capsule; Take 1 capsule (500 mg) by mouth once daily.  Depressive disorder  Comments:  chronic, mood stable, continue Cymbalta, Wellbutrin    Routine labs obtained in home- CBC, CMP, A1c, Lipid panel, urine microalbumin- pt tolerated well    Additionally, we discussed the importance of medication compliance. I do believe the missed doses of medication is related to higher BP readings and likely due to increased thirst as he has missed doses of his Actos       Keyla Nuñez, APRN-CNP

## 2023-12-13 LAB
CREAT UR-MCNC: 71.3 MG/DL (ref 20–370)
EST. AVERAGE GLUCOSE BLD GHB EST-MCNC: 240 MG/DL
HBA1C MFR BLD: 10 %
MICROALBUMIN UR-MCNC: <7 MG/L
MICROALBUMIN/CREAT UR: NORMAL MG/G{CREAT}

## 2023-12-23 DIAGNOSIS — F32.9 MAJOR DEPRESSIVE DISORDER, SINGLE EPISODE, UNSPECIFIED: ICD-10-CM

## 2023-12-26 RX ORDER — BUPROPION HYDROCHLORIDE 150 MG/1
TABLET ORAL
Qty: 90 TABLET | Refills: 3 | Status: SHIPPED | OUTPATIENT
Start: 2023-12-26

## 2024-01-17 ENCOUNTER — TELEPHONE (OUTPATIENT)
Dept: PRIMARY CARE | Facility: CLINIC | Age: 74
End: 2024-01-17
Payer: MEDICARE

## 2024-01-17 NOTE — TELEPHONE ENCOUNTER
Bhupinder obrien message on machine stating [facility] has informed him the Eliquis is $500.00 asking for assistance with this medication.  Is this something the  is able to assist with?

## 2024-02-02 ENCOUNTER — TELEPHONE (OUTPATIENT)
Dept: PRIMARY CARE | Facility: CLINIC | Age: 74
End: 2024-02-02
Payer: MEDICARE

## 2024-02-05 ENCOUNTER — OFFICE VISIT (OUTPATIENT)
Dept: PRIMARY CARE | Facility: CLINIC | Age: 74
End: 2024-02-05
Payer: MEDICARE

## 2024-02-05 VITALS
HEIGHT: 75 IN | RESPIRATION RATE: 18 BRPM | BODY MASS INDEX: 32.92 KG/M2 | DIASTOLIC BLOOD PRESSURE: 74 MMHG | OXYGEN SATURATION: 96 % | TEMPERATURE: 97.1 F | WEIGHT: 264.77 LBS | HEART RATE: 95 BPM | SYSTOLIC BLOOD PRESSURE: 136 MMHG

## 2024-02-05 DIAGNOSIS — R29.6 RECURRENT FALLS: ICD-10-CM

## 2024-02-05 DIAGNOSIS — I10 ESSENTIAL HYPERTENSION: Primary | ICD-10-CM

## 2024-02-05 DIAGNOSIS — B02.29 POSTHERPETIC NEURALGIA: ICD-10-CM

## 2024-02-05 DIAGNOSIS — I50.9 CHRONIC CONGESTIVE HEART FAILURE, UNSPECIFIED HEART FAILURE TYPE (MULTI): ICD-10-CM

## 2024-02-05 DIAGNOSIS — E78.1 HIGH TRIGLYCERIDES: ICD-10-CM

## 2024-02-05 DIAGNOSIS — K59.04 CHRONIC IDIOPATHIC CONSTIPATION: ICD-10-CM

## 2024-02-05 DIAGNOSIS — E11.9 TYPE 2 DIABETES MELLITUS WITHOUT COMPLICATION, WITHOUT LONG-TERM CURRENT USE OF INSULIN (MULTI): ICD-10-CM

## 2024-02-05 PROCEDURE — 1036F TOBACCO NON-USER: CPT | Performed by: NURSE PRACTITIONER

## 2024-02-05 PROCEDURE — 4010F ACE/ARB THERAPY RXD/TAKEN: CPT | Performed by: NURSE PRACTITIONER

## 2024-02-05 PROCEDURE — 99349 HOME/RES VST EST MOD MDM 40: CPT | Performed by: NURSE PRACTITIONER

## 2024-02-05 PROCEDURE — 1159F MED LIST DOCD IN RCRD: CPT | Performed by: NURSE PRACTITIONER

## 2024-02-05 PROCEDURE — 1126F AMNT PAIN NOTED NONE PRSNT: CPT | Performed by: NURSE PRACTITIONER

## 2024-02-05 PROCEDURE — 1160F RVW MEDS BY RX/DR IN RCRD: CPT | Performed by: NURSE PRACTITIONER

## 2024-02-05 PROCEDURE — 3078F DIAST BP <80 MM HG: CPT | Performed by: NURSE PRACTITIONER

## 2024-02-05 PROCEDURE — 3075F SYST BP GE 130 - 139MM HG: CPT | Performed by: NURSE PRACTITIONER

## 2024-02-05 ASSESSMENT — PAIN SCALES - GENERAL: PAINLEVEL: 0-NO PAIN

## 2024-02-05 NOTE — TELEPHONE ENCOUNTER
I was unable to leave voice message mailbox full for patient at      Telephone Information:   Mobile [phone]   I returning her call stating the arrival time for her surgery on 09/14 is @ 10:45.       Patient phoned 2/4/24 to confirm House Calls visit.

## 2024-02-05 NOTE — PROGRESS NOTES
Subjective   Patient ID: Bhupinder Cortez is a 73 y.o. male who presents for Follow-up (HTN, DM, discuss abnormal labs).    Visit for 74 y/o male seen today in private home, alone for routine follow of chronic medical conditions. Patient was standing in the doorway upon provider arrival. He was able to ambulate without assistive device into the dining room and is sitting down now. He is alert, oriented. Lives with his son Bhupinder and ROCCO Sloan. He does not drive or have a vehicle and only leaves the house with his son to go to the [employer] monthly. He has limited mobility making it difficult to get out for medical appointments. PCP is Dr. Esparza. Patient keeps his medications in one central location on his dining room table. He has a post it note for his day pills vs. Night pills which helps his son to remind him if he has not taken his medications. Patient reports that he has been trying to work on compliance. He has not missed any pills recently but does admit that he will take the pills late some days as he will forget to take them in the morning. He is able to do his own dressing, bathing and hygiene but requires assistance with meal preparation. He denies appetite changes, abdominal pain, nausea, vomiting. He has had weight loss. He has been trying to exercise more frequently. Patient has history of chronic pain, mostly post herpetic neuralgia pain to left shoulder and torso. He was previously seeing pain management specialist Dr. Ceron but reports that it has been several years and he is hoping to establish care with a new pain management specialist closer in proximity to where he lives. He does report having a fall about 2 weeks ago. He was making his bed and then ended up on the floor. He denies head injury or LOC. Denies dizziness prior to the fall. He did initially have some back pain but reports this has improved.     DM-  patient continues on Actos. His glucose levels are uncontrolled. His last A1c was  10.0. He has been trying to limit his sugar and carb intake since finding out his results and reports that he is not as thirsty as he was before. He has tried many diabetic medications in the past but was unable to tolerate them. He does not want to take insulin and does not routinely monitor his BG levels in the home.      HTN- patients BP was slightly elevated during last follow up. He does not see cardiology. Does not routinely monitor his BP. He denies headaches, dizziness, blurry vision, chest pain, palpitations.    Hyperlipidemia-lipid panel obtained in December. Cholesterol stable. Triglycerides elevated at 331. Pt was started on fish oil supplement daily. Tolerating it well.      CHF- patient continues on Furosemide. He admits to shortness of breath with exertion. Denies feeling short of breath at rest. Denies issues with edema.      Home Visit:          Medically necessary due to: patient has limited support systems to help the patient attend office visits, Illness or condition that results in activity lmitation or restriction that impacts the ability to leave home such as:, unsteady gait/poor balance.          Current Outpatient Medications:     acetaminophen (Tylenol) 500 mg tablet, Take 2 tablets (1,000 mg) by mouth once daily., Disp: , Rfl:     apixaban (Eliquis) 5 mg tablet, TAKE ONE (1) TABLET BY MOUTH TWICE DAILY WITH OR WITHOUT FOOD., Disp: 60 tablet, Rfl: 11    atorvastatin (Lipitor) 80 mg tablet, Take 1 tablet (80 mg) by mouth once daily., Disp: , Rfl:     buPROPion XL (Wellbutrin XL) 150 mg 24 hr tablet, TAKE 1 TABLET BY MOUTH EVERY DAY IN THE MORNING FOR 90 DAYS, Disp: 90 tablet, Rfl: 3    chlorthalidone (Hygroton) 25 mg tablet, Take 1 tablet (25 mg) by mouth once daily in the morning., Disp: 90 tablet, Rfl: 3    DULoxetine (Cymbalta) 30 mg DR capsule, Take 1 capsule (30 mg) by mouth once daily. TAKE 1 CAPSULE BY MOUTH EVERY DAY WITH 60 MG CAPSULE, Disp: 90 capsule, Rfl: 3    DULoxetine  (Cymbalta) 60 mg DR capsule, Take 1 capsule (60 mg) by mouth once daily. TAKE 1 CAPSULE BY MOUTH EVERY DAY WITH 30MG CAPSULE, Disp: 90 capsule, Rfl: 3    fish oil (Omega-3) 60- mg capsule, Take 1 capsule (500 mg) by mouth once daily., Disp: 30 capsule, Rfl: 11    furosemide (Lasix) 20 mg tablet, Take 1 tablet (20 mg) by mouth once daily., Disp: , Rfl:     losartan (Cozaar) 100 mg tablet, Take 1 tablet (100 mg) by mouth once daily., Disp: 90 tablet, Rfl: 3    metoprolol tartrate (Lopressor) 100 mg tablet, Take 1 tablet (100 mg) by mouth 2 times a day., Disp: 180 tablet, Rfl: 3    multivitamin with minerals iron-free (multivit-iron-minerals-folic acid), Take 1 tablet by mouth once daily., Disp: , Rfl:     nystatin (Mycostatin) 100,000 unit/gram powder, Apply 1 Application topically 2 times a day. Externally to left axilla as needed, Disp: , Rfl:     nystatin (Mycostatin) cream, Apply 1 Application topically 2 times a day as needed., Disp: , Rfl:     pantoprazole (ProtoNix) 20 mg EC tablet, Take 1 tablet (20 mg) by mouth once daily., Disp: 90 tablet, Rfl: 3    pioglitazone (Actos) 15 mg tablet, Take 1 tablet (15 mg) by mouth once daily., Disp: 90 tablet, Rfl: 3    potassium chloride CR 20 mEq ER tablet, Take 1 tablet (20 mEq) by mouth 2 times a day., Disp: , Rfl:      Review of Systems  Constitutional: Positive for weight loss. Negative for appetite change, chills, fever.  HENT:  Negative for trouble swallowing.    Respiratory:  Positive for shortness of breath on exertion. Negative for cough and wheezing.    Cardiovascular:  Negative for chest pain and palpitations.   Gastrointestinal: Positive for occasional constipation. Negative for abdominal pain, diarrhea, nausea and vomiting.   Endocrine: Positive for diabetes   Genitourinary:  Negative for difficulty urinating.   Musculoskeletal:  Positive for arthralgias, unsteady gait, difficulty with balance, recent fall   Neurological:  Negative for dizziness and  "light-headedness.   Hematological:  Does not bruise/bleed easily.   Psychiatric/Behavioral: Positive for depression    Objective   /74 (BP Location: Left arm, Patient Position: Sitting, BP Cuff Size: Adult)   Pulse 95   Temp 36.2 °C (97.1 °F) (Temporal)   Resp 18   Ht 1.905 m (6' 3\")   Wt 120 kg (264 lb 12.4 oz)   SpO2 96%   BMI 33.09 kg/m²     Physical Exam  Constitutional:       General: Alert, sitting at dining room table. He is not in acute distress.     Appearance: Normal appearance. Clothes clean.   HENT:      Head: Normocephalic and atraumatic.      Nose: Nose normal.      Mouth/Throat:      Mouth: Mucous membranes are moist.      Pharynx: Oropharynx is clear.   Eyes:      Extraocular Movements: Extraocular movements intact.      Pupils: Pupils are equal, round, and reactive to light.   Cardiovascular:      Rate and Rhythm: Normal rate and regular rhythm.      Pulses: Normal pulses.      Heart sounds: Normal heart sounds. No murmur heard.     No friction rub. No gallop.   Pulmonary:      Effort: Pulmonary effort is normal. No respiratory distress.      Breath sounds: Normal breath sounds.   Abdominal:      General: Bowel sounds are normal. There is no distension.      Palpations: Abdomen is soft.      Tenderness: There is no abdominal tenderness.   Musculoskeletal:      Cervical back: Neck supple.      Right lower leg: No edema.      Left lower leg: No edema.      No spinal tenderness on exam  Skin:     General: Skin is warm and dry.   Neurological:      General: No focal deficit present.      Mental Status: He is alert and oriented to person, place, and time.   Psychiatric:         Mood and Affect: Mood normal.         Behavior: Behavior normal.     Assessment/Plan   Diagnoses and all orders for this visit:  Essential hypertension  Comments:  chronic, BP stable on exam. Continue Losartan, Metoprolol, Chlorthalidone  Chronic congestive heart failure, unspecified heart failure type " (CMS/[facility])  Comments:  chronic, no edema. Weight stable. Continue Furosemide, Potassium.  High triglycerides  Comments:  acute, lipid panel abnormal. Continue fish oil supplement  Type 2 diabetes mellitus without complication, without long-term current use of insulin (CMS/[facility])  Comments:  chronic, uncontrolled. Continue Actos. Continue to work on diet habits, reducing sugar and carb intake  Chronic idiopathic constipation  Comments:  chronic, managed with colace as needed  Postherpetic neuralgia  Comments:  chronic issue, was seeing pain management several years ago. Will send new referral.  Orders:  -     Referral to Pain Medicine; Future  Recurrent falls  Comments:  acute, intermittent. Last fall was 2 weeks ago. No injury.    Patient stable. Will continue house calls NP program due to limited mobility, difficulty getting out for medical appointments. Advised pt to contact house calls office with any acute concerns or medication needs       Keyla Nuñez, DIANA-CNP

## 2024-02-28 DIAGNOSIS — I50.9 HEART FAILURE, UNSPECIFIED (MULTI): ICD-10-CM

## 2024-02-28 RX ORDER — FUROSEMIDE 20 MG/1
20 TABLET ORAL DAILY
Qty: 90 TABLET | Refills: 3 | Status: SHIPPED | OUTPATIENT
Start: 2024-02-28

## 2024-03-22 DIAGNOSIS — I50.9 HEART FAILURE, UNSPECIFIED (MULTI): ICD-10-CM

## 2024-03-22 RX ORDER — POTASSIUM CHLORIDE 20 MEQ/1
20 TABLET, EXTENDED RELEASE ORAL 2 TIMES DAILY
Qty: 180 TABLET | Refills: 3 | Status: SHIPPED | OUTPATIENT
Start: 2024-03-22

## 2024-04-02 ENCOUNTER — TELEPHONE (OUTPATIENT)
Dept: PRIMARY CARE | Facility: CLINIC | Age: 74
End: 2024-04-02
Payer: MEDICARE

## 2024-04-02 NOTE — TELEPHONE ENCOUNTER
Phoned patient in follow up to confirm 4/3/24 House Calls visit with Keyla Nuñez NP, no answer, LMOM.

## 2024-04-03 ENCOUNTER — LAB (OUTPATIENT)
Dept: LAB | Facility: LAB | Age: 74
End: 2024-04-03
Payer: MEDICARE

## 2024-04-03 ENCOUNTER — OFFICE VISIT (OUTPATIENT)
Dept: PRIMARY CARE | Facility: CLINIC | Age: 74
End: 2024-04-03
Payer: MEDICARE

## 2024-04-03 VITALS
BODY MASS INDEX: 33.47 KG/M2 | SYSTOLIC BLOOD PRESSURE: 126 MMHG | HEIGHT: 75 IN | DIASTOLIC BLOOD PRESSURE: 70 MMHG | OXYGEN SATURATION: 94 % | WEIGHT: 269.18 LBS | RESPIRATION RATE: 18 BRPM | HEART RATE: 87 BPM | TEMPERATURE: 97.3 F

## 2024-04-03 DIAGNOSIS — E11.9 TYPE 2 DIABETES MELLITUS WITHOUT COMPLICATION, WITHOUT LONG-TERM CURRENT USE OF INSULIN (MULTI): Primary | ICD-10-CM

## 2024-04-03 DIAGNOSIS — I48.3 TYPICAL ATRIAL FLUTTER (MULTI): ICD-10-CM

## 2024-04-03 DIAGNOSIS — I50.9 CHRONIC CONGESTIVE HEART FAILURE, UNSPECIFIED HEART FAILURE TYPE (MULTI): ICD-10-CM

## 2024-04-03 DIAGNOSIS — G89.29 OTHER CHRONIC PAIN: ICD-10-CM

## 2024-04-03 DIAGNOSIS — E11.9 TYPE 2 DIABETES MELLITUS WITHOUT COMPLICATION, WITHOUT LONG-TERM CURRENT USE OF INSULIN (MULTI): ICD-10-CM

## 2024-04-03 DIAGNOSIS — K21.9 GASTRO-ESOPHAGEAL REFLUX DISEASE WITHOUT ESOPHAGITIS: ICD-10-CM

## 2024-04-03 DIAGNOSIS — I10 ESSENTIAL HYPERTENSION: ICD-10-CM

## 2024-04-03 DIAGNOSIS — E78.2 MIXED HYPERLIPIDEMIA: ICD-10-CM

## 2024-04-03 DIAGNOSIS — F32.A DEPRESSIVE DISORDER: ICD-10-CM

## 2024-04-03 LAB
ANION GAP SERPL CALC-SCNC: 14 MMOL/L (ref 10–20)
BUN SERPL-MCNC: 33 MG/DL (ref 6–23)
CALCIUM SERPL-MCNC: 9.6 MG/DL (ref 8.6–10.3)
CHLORIDE SERPL-SCNC: 100 MMOL/L (ref 98–107)
CO2 SERPL-SCNC: 29 MMOL/L (ref 21–32)
CREAT SERPL-MCNC: 1.52 MG/DL (ref 0.5–1.3)
EGFRCR SERPLBLD CKD-EPI 2021: 47 ML/MIN/1.73M*2
ERYTHROCYTE [DISTWIDTH] IN BLOOD BY AUTOMATED COUNT: 13.4 % (ref 11.5–14.5)
EST. AVERAGE GLUCOSE BLD GHB EST-MCNC: 169 MG/DL
GLUCOSE SERPL-MCNC: 194 MG/DL (ref 74–99)
HBA1C MFR BLD: 7.5 %
HCT VFR BLD AUTO: 46 % (ref 41–52)
HGB BLD-MCNC: 13.7 G/DL (ref 13.5–17.5)
MCH RBC QN AUTO: 29 PG (ref 26–34)
MCHC RBC AUTO-ENTMCNC: 29.8 G/DL (ref 32–36)
MCV RBC AUTO: 98 FL (ref 80–100)
NRBC BLD-RTO: 0 /100 WBCS (ref 0–0)
PLATELET # BLD AUTO: 139 X10*3/UL (ref 150–450)
POTASSIUM SERPL-SCNC: 3.7 MMOL/L (ref 3.5–5.3)
RBC # BLD AUTO: 4.72 X10*6/UL (ref 4.5–5.9)
SODIUM SERPL-SCNC: 139 MMOL/L (ref 136–145)
WBC # BLD AUTO: 6.3 X10*3/UL (ref 4.4–11.3)

## 2024-04-03 PROCEDURE — 3074F SYST BP LT 130 MM HG: CPT | Performed by: NURSE PRACTITIONER

## 2024-04-03 PROCEDURE — 1159F MED LIST DOCD IN RCRD: CPT | Performed by: NURSE PRACTITIONER

## 2024-04-03 PROCEDURE — 1125F AMNT PAIN NOTED PAIN PRSNT: CPT | Performed by: NURSE PRACTITIONER

## 2024-04-03 PROCEDURE — 1036F TOBACCO NON-USER: CPT | Performed by: NURSE PRACTITIONER

## 2024-04-03 PROCEDURE — 4010F ACE/ARB THERAPY RXD/TAKEN: CPT | Performed by: NURSE PRACTITIONER

## 2024-04-03 PROCEDURE — 36415 COLL VENOUS BLD VENIPUNCTURE: CPT | Performed by: NURSE PRACTITIONER

## 2024-04-03 PROCEDURE — 1160F RVW MEDS BY RX/DR IN RCRD: CPT | Performed by: NURSE PRACTITIONER

## 2024-04-03 PROCEDURE — 36415 COLL VENOUS BLD VENIPUNCTURE: CPT

## 2024-04-03 PROCEDURE — 3078F DIAST BP <80 MM HG: CPT | Performed by: NURSE PRACTITIONER

## 2024-04-03 PROCEDURE — 83036 HEMOGLOBIN GLYCOSYLATED A1C: CPT

## 2024-04-03 PROCEDURE — 99349 HOME/RES VST EST MOD MDM 40: CPT | Performed by: NURSE PRACTITIONER

## 2024-04-03 ASSESSMENT — PAIN SCALES - GENERAL: PAINLEVEL: 7

## 2024-04-03 NOTE — PROGRESS NOTES
Subjective   Patient ID: Bhupinder Cortez is a 73 y.o. male who presents for Follow-up (Routine follow up, uncontrolled diabetes, labs ).    Visit for 72 y/o male seen today in private home, alone for routine follow up. Patient is sitting at dining room table this morning. He is alert, oriented, able to answer all questions regarding his health. Patient lives with his son Bhupinder and daughter in law Nathalia. He does not drive or have a vehicle and only leaves the house with his son to go to the bank once monthly. His PCP is Dr. Esparza but patient has not been seen in office in several years due to limited mobility. Patients son assists with medication management. Patient is able to do his own dressing, bathing and hygiene but requires assistance with meal preparation. He denies appetite changes, abdominal pain, nausea, vomiting. He denies bowel or bladder concerns. Patient is diabetic, uncontrolled. He has been trying to reduce his carb intake and reports that he started using Mokfruit sweetener in place of sugar since his last follow up. Patient has history of chronic pain, mostly post herpetic neuralgia pain to left shoulder and torso but he does report chronic knee pain today, worsening with the recent weather. He takes Tylenol as needed with improvement. No longer seeing pain management. He is ambulatory without assistive device. Denies recent fall or injury.     DM-  patient continues on Actos. His glucose levels are uncontrolled. His last A1c was 10.0 which was much higher than his previous level. He has tried many diabetic medications in the past but was unable to tolerate them. He does not want to take insulin and does not routinely monitor his BG levels in the home.      HTN- patient does not see cardiology. He does not routinely monitor his BP. He denies headaches, dizziness, blurry vision, chest pain, palpitations.    Hyperlipidemia-lipid panel in December with elevated triglycerides. Patient is taking  fish oil daily. Reports that the fish oil has actually helped with his constipation.      CHF- patient continues on Furosemide. He admits to shortness of breath with exertion. Denies feeling short of breath at rest. Denies issues with edema.      Home Visit:          Medically necessary due to: patient has limited support systems to help the patient attend office visits, Illness or condition that results in activity lmitation or restriction that impacts the ability to leave home such as:, unsteady gait/poor balance.          Current Outpatient Medications:     acetaminophen (Tylenol) 500 mg tablet, Take 2 tablets (1,000 mg) by mouth once daily., Disp: , Rfl:     apixaban (Eliquis) 5 mg tablet, TAKE ONE (1) TABLET BY MOUTH TWICE DAILY WITH OR WITHOUT FOOD., Disp: 60 tablet, Rfl: 11    atorvastatin (Lipitor) 80 mg tablet, Take 1 tablet (80 mg) by mouth once daily., Disp: , Rfl:     buPROPion XL (Wellbutrin XL) 150 mg 24 hr tablet, TAKE 1 TABLET BY MOUTH EVERY DAY IN THE MORNING FOR 90 DAYS, Disp: 90 tablet, Rfl: 3    chlorthalidone (Hygroton) 25 mg tablet, Take 1 tablet (25 mg) by mouth once daily in the morning., Disp: 90 tablet, Rfl: 3    DULoxetine (Cymbalta) 30 mg DR capsule, Take 1 capsule (30 mg) by mouth once daily. TAKE 1 CAPSULE BY MOUTH EVERY DAY WITH 60 MG CAPSULE, Disp: 90 capsule, Rfl: 3    DULoxetine (Cymbalta) 60 mg DR capsule, Take 1 capsule (60 mg) by mouth once daily. TAKE 1 CAPSULE BY MOUTH EVERY DAY WITH 30MG CAPSULE, Disp: 90 capsule, Rfl: 3    fish oil (Omega-3) 60- mg capsule, Take 1 capsule (500 mg) by mouth once daily., Disp: 30 capsule, Rfl: 11    furosemide (Lasix) 20 mg tablet, Take 1 tablet (20 mg) by mouth once daily., Disp: 90 tablet, Rfl: 3    losartan (Cozaar) 100 mg tablet, Take 1 tablet (100 mg) by mouth once daily., Disp: 90 tablet, Rfl: 3    metoprolol tartrate (Lopressor) 100 mg tablet, Take 1 tablet (100 mg) by mouth 2 times a day., Disp: 180 tablet, Rfl: 3    multivitamin  "with minerals iron-free (multivit-iron-minerals-folic acid), Take 1 tablet by mouth once daily., Disp: , Rfl:     nystatin (Mycostatin) 100,000 unit/gram powder, Apply 1 Application topically 2 times a day. Externally to left axilla as needed, Disp: , Rfl:     nystatin (Mycostatin) cream, Apply 1 Application topically 2 times a day as needed., Disp: , Rfl:     pantoprazole (ProtoNix) 20 mg EC tablet, Take 1 tablet (20 mg) by mouth once daily., Disp: 90 tablet, Rfl: 3    pioglitazone (Actos) 15 mg tablet, Take 1 tablet (15 mg) by mouth once daily., Disp: 90 tablet, Rfl: 3    potassium chloride CR (Klor-Con M20) 20 mEq ER tablet, Take 1 tablet (20 mEq) by mouth 2 times a day., Disp: 180 tablet, Rfl: 3     Review of Systems  Constitutional: Negative for appetite change, chills, fever.  HENT:  Negative for trouble swallowing.    Respiratory:  Positive for shortness of breath on exertion. Negative for cough and wheezing.    Cardiovascular:  Negative for chest pain and palpitations.   Gastrointestinal: Negative for abdominal pain, constipation, diarrhea, nausea and vomiting.   Endocrine: Positive for diabetes, uncontrolled   Genitourinary:  Negative for difficulty urinating.   Musculoskeletal:  Positive for arthralgias, chronic knee pain, unsteady gait, difficulty with balance  Neurological:  Negative for dizziness and light-headedness.   Hematological:  Does not bruise/bleed easily.   Psychiatric/Behavioral: Positive for depression    Objective   /70 (BP Location: Left arm, Patient Position: Sitting, BP Cuff Size: Adult)   Pulse 87   Temp 36.3 °C (97.3 °F)   Resp 18   Ht 1.905 m (6' 3\")   Wt 122 kg (269 lb 2.9 oz)   SpO2 94%   BMI 33.65 kg/m²     Physical Exam  Constitutional:       General: Alert, seen at dining room table. He is not in acute distress.     Appearance: Normal appearance. Clothes clean.   HENT:      Head: Normocephalic and atraumatic.      Nose: Nose normal.      Mouth/Throat:      Mouth: " Mucous membranes are moist.      Pharynx: Oropharynx is clear.   Eyes:      Extraocular Movements: Extraocular movements intact.      Pupils: Pupils are equal, round, and reactive to light.   Cardiovascular:      Rate and Rhythm: Normal rate and regular rhythm.      Pulses: Normal pulses.      Heart sounds: Normal heart sounds. No murmur heard.     No friction rub. No gallop.   Pulmonary:      Effort: Pulmonary effort is normal. No respiratory distress.      Breath sounds: Normal breath sounds.   Abdominal:      General: Bowel sounds are normal. There is no distension.      Palpations: Abdomen is soft.      Tenderness: There is no abdominal tenderness.   Musculoskeletal:      Cervical back: Neck supple.      Right lower leg: No edema.      Left lower leg: No edema.   Skin:     General: Skin is warm and dry.   Neurological:      General: No focal deficit present.      Mental Status: He is alert and oriented to person, place, and time.   Psychiatric:         Mood and Affect: Mood normal.         Behavior: Behavior normal.     Assessment/Plan   Diagnoses and all orders for this visit:  Type 2 diabetes mellitus without complication, without long-term current use of insulin (CMS/[facility])  -     Hemoglobin A1c; Future  -chronic, poorly controlled  -last A1c was 10.0  -will check A1c today  -continue pioglitazone    Gastro-esophageal reflux disease without esophagitis  -chronic, stable  -continue Pantoprazole     Essential hypertension  -     CBC; Future  -     Basic metabolic panel; Future  -chronic, vitals stable on exam  -continue chlorthalidone, losartan, metoprolol     Typical atrial flutter (CMS/HCC)  -chronic, stable  -HR regular, controlled on exam  -continue Eliquis, Metoprolol    Mixed hyperlipidemia  -chronic, stable  -continue atorvastatin, fish oil supplement     Chronic congestive heart failure, unspecified heart failure type (CMS/[facility])  -chronic, stable  -euvolemic on exam  -continue furosemide,  potassium    Other chronic pain  -chronic, bilateral knee pain noted   -no longer active with pain mgmt   -continue Tylenol as needed    Depressive disorder   -chronic, mood stable on exam  -does have occasional agitation   -will keep current dosage of Duloxetine and Bupropion     Routine labs obtained in home- CBC, BMP, A1c level - pt tolerated well   I called patients son Bhupinder per patients request and updated him on plan of care.        Keyla Nuñez, APRN-CNP

## 2024-06-07 ENCOUNTER — TELEPHONE (OUTPATIENT)
Dept: PRIMARY CARE | Facility: CLINIC | Age: 74
End: 2024-06-07
Payer: MEDICARE

## 2024-06-10 ENCOUNTER — OFFICE VISIT (OUTPATIENT)
Dept: PRIMARY CARE | Facility: CLINIC | Age: 74
End: 2024-06-10
Payer: MEDICARE

## 2024-06-10 VITALS
DIASTOLIC BLOOD PRESSURE: 64 MMHG | SYSTOLIC BLOOD PRESSURE: 128 MMHG | RESPIRATION RATE: 18 BRPM | HEART RATE: 60 BPM | WEIGHT: 269.62 LBS | HEIGHT: 75 IN | TEMPERATURE: 97.1 F | BODY MASS INDEX: 33.52 KG/M2 | OXYGEN SATURATION: 99 %

## 2024-06-10 DIAGNOSIS — E11.9 TYPE 2 DIABETES MELLITUS WITHOUT COMPLICATION, WITHOUT LONG-TERM CURRENT USE OF INSULIN (MULTI): ICD-10-CM

## 2024-06-10 DIAGNOSIS — I50.9 CHRONIC CONGESTIVE HEART FAILURE, UNSPECIFIED HEART FAILURE TYPE (MULTI): ICD-10-CM

## 2024-06-10 DIAGNOSIS — K21.9 GASTRO-ESOPHAGEAL REFLUX DISEASE WITHOUT ESOPHAGITIS: ICD-10-CM

## 2024-06-10 DIAGNOSIS — I48.3 TYPICAL ATRIAL FLUTTER (MULTI): ICD-10-CM

## 2024-06-10 DIAGNOSIS — I10 ESSENTIAL HYPERTENSION: Primary | ICD-10-CM

## 2024-06-10 PROCEDURE — 3074F SYST BP LT 130 MM HG: CPT | Performed by: NURSE PRACTITIONER

## 2024-06-10 PROCEDURE — 3078F DIAST BP <80 MM HG: CPT | Performed by: NURSE PRACTITIONER

## 2024-06-10 PROCEDURE — 1160F RVW MEDS BY RX/DR IN RCRD: CPT | Performed by: NURSE PRACTITIONER

## 2024-06-10 PROCEDURE — 99349 HOME/RES VST EST MOD MDM 40: CPT | Performed by: NURSE PRACTITIONER

## 2024-06-10 PROCEDURE — 3051F HG A1C>EQUAL 7.0%<8.0%: CPT | Performed by: NURSE PRACTITIONER

## 2024-06-10 PROCEDURE — 1126F AMNT PAIN NOTED NONE PRSNT: CPT | Performed by: NURSE PRACTITIONER

## 2024-06-10 PROCEDURE — 1036F TOBACCO NON-USER: CPT | Performed by: NURSE PRACTITIONER

## 2024-06-10 PROCEDURE — 4010F ACE/ARB THERAPY RXD/TAKEN: CPT | Performed by: NURSE PRACTITIONER

## 2024-06-10 PROCEDURE — 1159F MED LIST DOCD IN RCRD: CPT | Performed by: NURSE PRACTITIONER

## 2024-06-10 ASSESSMENT — PAIN SCALES - GENERAL: PAINLEVEL: 0-NO PAIN

## 2024-06-10 NOTE — PROGRESS NOTES
Subjective   Patient ID: Bhupinder Cortez is a 73 y.o. male who presents for Follow-up (Routine 2 month follow up, chronic medical conditions ).    Visit for 74 y/o male seen today in private home, alone for routine follow up of chronic medical conditions. Patient is sitting at dining room table this morning. He is alert, oriented, able to answer all questions regarding his health. He lives with his son Bhupinder and daughter in law Nathalia. He does not drive or have a vehicle and only leaves the house with his son to go to the bank once monthly but admits that it is difficult to get out for medical appointments. His PCP is Dr. Esparza but patient has not been seen in office in several years due to limited mobility and transportation difficulty. His family assists with medication management. Pt admits that he will occasionally forget to take his medications, mostly if he has something going on. Patient is able to do his own dressing, bathing and hygiene but requires assistance with meal preparation. He denies appetite changes, signs of weight loss, abdominal pain, nausea, vomiting. Denies bowel or bladder concerns. Pt is ambulatory without assistive device. He denies recent fall or injury.      DM-  patient continues on Actos. His last A1c improved to 7.5. Previous value was 10.0. He continues to use a sugar substitute and has been trying to lower his carb/junk food intake. He has tried many diabetic medications in the past but was unable to tolerate them.      HTN- patient was previously seeing Dr. Patino. Pt has not had any follow up appointments in several years. He does not routinely monitor his BP. Denies headaches, dizziness, blurry vision, chest pain, palpitations.     CHF- patient endorses shortness of breath with exertion but denies feeling short of breath at rest. He continues on Furosemide. Denies issues with edema or weight gain.      Home Visit:          Medically necessary due to: patient has limited  support systems to help the patient attend office visits, Illness or condition that results in activity lmitation or restriction that impacts the ability to leave home such as:, unsteady gait/poor balance.          Current Outpatient Medications:     acetaminophen (Tylenol) 500 mg tablet, Take 2 tablets (1,000 mg) by mouth once daily., Disp: , Rfl:     apixaban (Eliquis) 5 mg tablet, TAKE ONE (1) TABLET BY MOUTH TWICE DAILY WITH OR WITHOUT FOOD., Disp: 60 tablet, Rfl: 11    atorvastatin (Lipitor) 80 mg tablet, Take 1 tablet (80 mg) by mouth once daily., Disp: , Rfl:     buPROPion XL (Wellbutrin XL) 150 mg 24 hr tablet, TAKE 1 TABLET BY MOUTH EVERY DAY IN THE MORNING FOR 90 DAYS, Disp: 90 tablet, Rfl: 3    chlorthalidone (Hygroton) 25 mg tablet, Take 1 tablet (25 mg) by mouth once daily in the morning., Disp: 90 tablet, Rfl: 3    DULoxetine (Cymbalta) 30 mg DR capsule, Take 1 capsule (30 mg) by mouth once daily. TAKE 1 CAPSULE BY MOUTH EVERY DAY WITH 60 MG CAPSULE, Disp: 90 capsule, Rfl: 3    DULoxetine (Cymbalta) 60 mg DR capsule, Take 1 capsule (60 mg) by mouth once daily. TAKE 1 CAPSULE BY MOUTH EVERY DAY WITH 30MG CAPSULE, Disp: 90 capsule, Rfl: 3    fish oil (Omega-3) 60- mg capsule, Take 1 capsule (500 mg) by mouth once daily., Disp: 30 capsule, Rfl: 11    furosemide (Lasix) 20 mg tablet, Take 1 tablet (20 mg) by mouth once daily., Disp: 90 tablet, Rfl: 3    losartan (Cozaar) 100 mg tablet, Take 1 tablet (100 mg) by mouth once daily., Disp: 90 tablet, Rfl: 3    metoprolol tartrate (Lopressor) 100 mg tablet, Take 1 tablet (100 mg) by mouth 2 times a day., Disp: 180 tablet, Rfl: 3    multivitamin with minerals iron-free (multivit-iron-minerals-folic acid), Take 1 tablet by mouth once daily., Disp: , Rfl:     nystatin (Mycostatin) 100,000 unit/gram powder, Apply 1 Application topically 2 times a day. Externally to left axilla as needed, Disp: , Rfl:     nystatin (Mycostatin) cream, Apply 1 Application  "topically 2 times a day as needed., Disp: , Rfl:     pantoprazole (ProtoNix) 20 mg EC tablet, Take 1 tablet (20 mg) by mouth once daily., Disp: 90 tablet, Rfl: 3    pioglitazone (Actos) 15 mg tablet, Take 1 tablet (15 mg) by mouth once daily., Disp: 90 tablet, Rfl: 3    potassium chloride CR (Klor-Con M20) 20 mEq ER tablet, Take 1 tablet (20 mEq) by mouth 2 times a day., Disp: 180 tablet, Rfl: 3     Review of Systems  Constitutional: Negative for appetite change, chills, fever.  HENT:  Negative for trouble swallowing.    Respiratory:  Positive for shortness of breath on exertion. Negative for cough and wheezing.    Cardiovascular:  Negative for chest pain and palpitations.   Gastrointestinal: Negative for abdominal pain, constipation, diarrhea, nausea and vomiting.   Endocrine: Positive for diabetes, uncontrolled   Genitourinary:  Negative for difficulty urinating.   Musculoskeletal:  Positive for arthralgias, chronic knee pain, unsteady gait, difficulty with balance  Neurological:  Negative for dizziness and light-headedness.   Hematological:  Does not bruise/bleed easily.   Psychiatric/Behavioral: Positive for depression    Objective   /64 (BP Location: Left arm, Patient Position: Sitting, BP Cuff Size: Adult)   Pulse 60   Temp 36.2 °C (97.1 °F) (Temporal)   Resp 18   Ht 1.905 m (6' 3\")   Wt 122 kg (269 lb 10 oz)   SpO2 99%   BMI 33.70 kg/m²     Physical Exam  Constitutional:       General: Alert. He is not in acute distress.     Appearance: Normal appearance.   HENT:      Head: Normocephalic and atraumatic.      Nose: Nose normal.      Mouth/Throat:      Mouth: Mucous membranes are moist.      Pharynx: Oropharynx is clear.   Eyes:      Extraocular Movements: Extraocular movements intact.      Pupils: Pupils are equal, round, and reactive to light.   Cardiovascular:      Rate and Rhythm: Normal rate and regular rhythm.      Pulses: Normal pulses.      Heart sounds: Normal heart sounds. No murmur " heard.     No friction rub. No gallop.   Pulmonary:      Effort: Pulmonary effort is normal. No respiratory distress.      Breath sounds: Normal breath sounds.   Abdominal:      General: Bowel sounds are normal. There is no distension.      Palpations: Abdomen is soft.      Tenderness: There is no abdominal tenderness.   Musculoskeletal:      Cervical back: Neck supple.      Right lower leg: No edema.      Left lower leg: No edema.   Skin:     General: Skin is warm and dry.   Neurological:      General: No focal deficit present.      Mental Status: He is alert and oriented to person, place, and time.   Psychiatric:         Mood and Affect: Mood normal.         Behavior: Behavior normal.     Lab Results   Component Value Date    WBC 6.3 04/03/2024    HGB 13.7 04/03/2024    HCT 46.0 04/03/2024    MCV 98 04/03/2024     (L) 04/03/2024        Chemistry    Lab Results   Component Value Date/Time     04/03/2024 1227    K 3.7 04/03/2024 1227     04/03/2024 1227    CO2 29 04/03/2024 1227    BUN 33 (H) 04/03/2024 1227    CREATININE 1.52 (H) 04/03/2024 1227    Lab Results   Component Value Date/Time    CALCIUM 9.6 04/03/2024 1227    ALKPHOS 97 12/12/2023 1120    AST 13 12/12/2023 1120    ALT 16 12/12/2023 1120    BILITOT 0.8 12/12/2023 1120        Lab Results   Component Value Date    HGBA1C 7.5 (H) 04/03/2024     Assessment/Plan   Diagnoses and all orders for this visit:  Essential hypertension  -chronic, vitals stable  -continue chlorthalidone, losartan, metoprolol     Typical atrial flutter (Multi)  -chronic, stable  -HR regular, controlled on exam  -continue Eliquis, Metoprolol  -recommend follow up with cardiologist     Chronic congestive heart failure, unspecified heart failure type (Multi)  -chronic, stable  -euvolemic on exam  -continue furosemide, potassium    Type 2 diabetes mellitus without complication, without long-term current use of insulin (Multi)  -chronic, improving   -continue Actos    -continue to make healthy diet choices, increased physical activity     Gastro-esophageal reflux disease without esophagitis  -chronic, stable  -no current GI concerns   -continue Pantoprazole     Patient stable. He is doing well with frequent home follow up care. He feels this has helped him stay accountable with his health. He is trying to make healthier diet habits. He is going to try and increase his physical activity. Advised pt to contact house calls office with any acute concerns or medication needs.        Keyla Nuñez, DIANA-CNP

## 2024-08-09 ENCOUNTER — TELEPHONE (OUTPATIENT)
Dept: PRIMARY CARE | Facility: CLINIC | Age: 74
End: 2024-08-09
Payer: MEDICARE

## 2024-08-09 NOTE — TELEPHONE ENCOUNTER
Phoned patient in follow up to confirm 8/12/24 [facility] visit with Keyla Nuñez NP, no answer, LMOM.

## 2024-08-12 ENCOUNTER — OFFICE VISIT (OUTPATIENT)
Dept: PRIMARY CARE | Facility: CLINIC | Age: 74
End: 2024-08-12
Payer: MEDICARE

## 2024-08-12 VITALS
HEART RATE: 72 BPM | WEIGHT: 268.96 LBS | OXYGEN SATURATION: 95 % | SYSTOLIC BLOOD PRESSURE: 122 MMHG | HEIGHT: 75 IN | DIASTOLIC BLOOD PRESSURE: 64 MMHG | RESPIRATION RATE: 16 BRPM | TEMPERATURE: 97.3 F | BODY MASS INDEX: 33.44 KG/M2

## 2024-08-12 DIAGNOSIS — F32.A DEPRESSIVE DISORDER: ICD-10-CM

## 2024-08-12 DIAGNOSIS — I50.9 CHRONIC CONGESTIVE HEART FAILURE, UNSPECIFIED HEART FAILURE TYPE (MULTI): ICD-10-CM

## 2024-08-12 DIAGNOSIS — E11.9 TYPE 2 DIABETES MELLITUS WITHOUT COMPLICATION, WITHOUT LONG-TERM CURRENT USE OF INSULIN (MULTI): ICD-10-CM

## 2024-08-12 DIAGNOSIS — J31.0 CHRONIC RHINITIS: ICD-10-CM

## 2024-08-12 DIAGNOSIS — I10 ESSENTIAL HYPERTENSION: ICD-10-CM

## 2024-08-12 DIAGNOSIS — I48.3 TYPICAL ATRIAL FLUTTER (MULTI): Primary | ICD-10-CM

## 2024-08-12 DIAGNOSIS — K21.9 GASTRO-ESOPHAGEAL REFLUX DISEASE WITHOUT ESOPHAGITIS: ICD-10-CM

## 2024-08-12 PROCEDURE — 99349 HOME/RES VST EST MOD MDM 40: CPT | Performed by: NURSE PRACTITIONER

## 2024-08-12 PROCEDURE — 3074F SYST BP LT 130 MM HG: CPT | Performed by: NURSE PRACTITIONER

## 2024-08-12 PROCEDURE — 1125F AMNT PAIN NOTED PAIN PRSNT: CPT | Performed by: NURSE PRACTITIONER

## 2024-08-12 PROCEDURE — 3078F DIAST BP <80 MM HG: CPT | Performed by: NURSE PRACTITIONER

## 2024-08-12 PROCEDURE — 1160F RVW MEDS BY RX/DR IN RCRD: CPT | Performed by: NURSE PRACTITIONER

## 2024-08-12 PROCEDURE — 1036F TOBACCO NON-USER: CPT | Performed by: NURSE PRACTITIONER

## 2024-08-12 PROCEDURE — 1159F MED LIST DOCD IN RCRD: CPT | Performed by: NURSE PRACTITIONER

## 2024-08-12 RX ORDER — AZELASTINE 1 MG/ML
1 SPRAY, METERED NASAL 2 TIMES DAILY
Qty: 30 ML | Refills: 2 | Status: SHIPPED | OUTPATIENT
Start: 2024-08-12 | End: 2025-08-12

## 2024-08-12 ASSESSMENT — PAIN SCALES - GENERAL: PAINLEVEL: 7

## 2024-08-12 NOTE — PROGRESS NOTES
Subjective   Patient ID: Bhupinder Cortez is a 74 y.o. male who presents for Follow-up (2 month routine follow up).    Visit for 75 y/o male seen today in private home, alone for routine follow up. PMHx HTN, Hyperlipidemia, CAD, CHF, Atrial Flutter, GERD, DM, postherpatic neuralgia, anemia, chronic pain, iron deficiency anemia, macular degeneration, depression. Pt is sitting at dining room table this morning. He is alert, oriented, able to answer all questions regarding his health. Pt lives with his son Bhupinder and daughter in law Nathalia. He does not drive or have a vehicle and has limited mobility making it difficult to get out for medical appointments. Pt reports that he typically only leaves the house once a month to go to the bank or run errands with his son. Pts son Bhupinder manages his medications and calls in any necessary refills. Pt reports that he tries to be compliant with his medications but has had days where he does not remember if he took his pills so he just waits until the next time his medications are due. Pt is able to do his own dressing, bathing and hygiene but requires assistance with meal preparation. He is able to prepare simple meals for himself, mostly using the microwave. His DIL prepares most meals. Pt denies appetite changes, signs of weight loss, abdominal pain, nausea, vomiting. Denies bowel or bladder concerns. Pt does endorse nasal drainage, daily. He reports this to be constant, worsen when he is bending over. He denies sore throat. Does endorse occasional cough. Pt is ambulatory without assistive device. Pt does admit that he has balance difficulty and will have trouble if the cat is walking next to him but he denies any recent falls or injuries.      DM-  patient continues on Actos. He has tried many diabetic medications in the past but was unable to tolerate them. He has declined to use insulin. His last A1c was much improved. Pt continues to use a sugar substitute and has  been trying to lower his carb/junk food intake. He will be due to have A1c level checked at next follow up.      HTN- patient is no longer followed by cardiology. Pt was previously seeing Dr. Patino but he is retiring and pt does not want to establish care with new cardiologist at this time. Pt does not routinely monitor his BP. Pt denies headaches, dizziness, blurry vision, chest pain, palpitations.     CHF- patient endorses shortness of breath with exertion but denies feeling short of breath at rest. He does admit to occasional cough but denies wheezing, palpitations, edema, or weight gain.      Home Visit:          Medically necessary due to: patient has limited support systems to help the patient attend office visits, Illness or condition that results in activity lmitation or restriction that impacts the ability to leave home such as:, unsteady gait/poor balance.          Current Outpatient Medications:     acetaminophen (Tylenol) 500 mg tablet, Take 2 tablets (1,000 mg) by mouth once daily., Disp: , Rfl:     apixaban (Eliquis) 5 mg tablet, TAKE ONE (1) TABLET BY MOUTH TWICE DAILY WITH OR WITHOUT FOOD., Disp: 60 tablet, Rfl: 11    atorvastatin (Lipitor) 80 mg tablet, Take 1 tablet (80 mg) by mouth once daily., Disp: , Rfl:     buPROPion XL (Wellbutrin XL) 150 mg 24 hr tablet, TAKE 1 TABLET BY MOUTH EVERY DAY IN THE MORNING FOR 90 DAYS, Disp: 90 tablet, Rfl: 3    chlorthalidone (Hygroton) 25 mg tablet, Take 1 tablet (25 mg) by mouth once daily in the morning., Disp: 90 tablet, Rfl: 3    DULoxetine (Cymbalta) 30 mg DR capsule, Take 1 capsule (30 mg) by mouth once daily. TAKE 1 CAPSULE BY MOUTH EVERY DAY WITH 60 MG CAPSULE, Disp: 90 capsule, Rfl: 3    DULoxetine (Cymbalta) 60 mg DR capsule, Take 1 capsule (60 mg) by mouth once daily. TAKE 1 CAPSULE BY MOUTH EVERY DAY WITH 30MG CAPSULE, Disp: 90 capsule, Rfl: 3    fish oil (Omega-3) 60- mg capsule, Take 1 capsule (500 mg) by mouth once daily., Disp: 30  capsule, Rfl: 11    furosemide (Lasix) 20 mg tablet, Take 1 tablet (20 mg) by mouth once daily., Disp: 90 tablet, Rfl: 3    losartan (Cozaar) 100 mg tablet, Take 1 tablet (100 mg) by mouth once daily., Disp: 90 tablet, Rfl: 3    metoprolol tartrate (Lopressor) 100 mg tablet, Take 1 tablet (100 mg) by mouth 2 times a day., Disp: 180 tablet, Rfl: 3    multivitamin with minerals iron-free (multivit-iron-minerals-folic acid), Take 1 tablet by mouth once daily., Disp: , Rfl:     nystatin (Mycostatin) 100,000 unit/gram powder, Apply 1 Application topically 2 times a day. Externally to left axilla as needed, Disp: , Rfl:     nystatin (Mycostatin) cream, Apply 1 Application topically 2 times a day as needed., Disp: , Rfl:     pantoprazole (ProtoNix) 20 mg EC tablet, Take 1 tablet (20 mg) by mouth once daily., Disp: 90 tablet, Rfl: 3    pioglitazone (Actos) 15 mg tablet, Take 1 tablet (15 mg) by mouth once daily., Disp: 90 tablet, Rfl: 3    potassium chloride CR (Klor-Con M20) 20 mEq ER tablet, Take 1 tablet (20 mEq) by mouth 2 times a day., Disp: 180 tablet, Rfl: 3     Review of Systems  Constitutional: Negative for appetite change, chills, fever, unexplained weight loss.   HENT: Positive for nasal drainage. Negative for trouble swallowing.    Respiratory:  Positive for shortness of breath on exertion, occasional cough. Negative for wheezing.    Cardiovascular:  Negative for chest pain and palpitations.   Gastrointestinal: Negative for abdominal pain, constipation, diarrhea, nausea and vomiting.   Endocrine: Positive for diabetes  Genitourinary:  Negative for difficulty urinating.   Musculoskeletal:  Positive for arthralgias, chronic knee pain, back pain, unsteady gait, difficulty with balance  Neurological:  Negative for dizziness and light-headedness.   Hematological:  Does not bruise/bleed easily.   Psychiatric/Behavioral: Positive for depression    Objective   /64 (BP Location: Left arm, Patient Position: Sitting,  "BP Cuff Size: Adult)   Pulse 72   Temp 36.3 °C (97.3 °F) (Temporal)   Resp 16   Ht 1.905 m (6' 3\")   Wt 122 kg (268 lb 15.4 oz)   SpO2 95%   BMI 33.62 kg/m²     Physical Exam  Constitutional:       General: Alert. He is not in acute distress.     Appearance: Normal appearance. Sitting at dining room table.   HENT:      Head: Normocephalic and atraumatic.      Nose: Clear rhinorrhea.      Mouth/Throat:      Mouth: Mucous membranes are moist.      Pharynx: Oropharynx is clear.   Eyes:      Extraocular Movements: Extraocular movements intact.      Pupils: Pupils are equal, round, and reactive to light.   Cardiovascular:      Rate and Rhythm: Normal rate and regular rhythm.      Pulses: Normal pulses.      Heart sounds: Normal heart sounds. No murmur heard.     No friction rub. No gallop.   Pulmonary:      Effort: Pulmonary effort is normal. No respiratory distress.      Breath sounds: Normal breath sounds.   Abdominal:      General: Bowel sounds are normal. There is no distension.      Palpations: Abdomen is soft.      Tenderness: There is no abdominal tenderness.   Musculoskeletal:      Cervical back: Neck supple.      Right lower leg: No edema.      Left lower leg: No edema.   Skin:     General: Skin is warm and dry.   Neurological:      General: No focal deficit present.      Mental Status: He is alert and oriented to person, place, and time.   Psychiatric:         Mood and Affect: Mood normal.         Behavior: Behavior normal.     Assessment/Plan   Diagnoses and all orders for this visit:  Typical atrial flutter (Multi)  -chronic, stable, HR regular, rate controlled  -continue Eliquis, Metoprolol    Essential hypertension  -chronic, vitals stable  -continue current dose of Chlorthalidone, Losartan, Metoprolol    Chronic congestive heart failure, unspecified heart failure type (Multi  -chronic, stable, euvolemic on exam  -continue furosemide, potassium    Gastro-esophageal reflux disease without " esophagitis  -chronic, stable, no current GI concerns  -continue pantoprazole     Type 2 diabetes mellitus without complication, without long-term current use of insulin (Multi)  -chronic, managed with Actos  -will check A1c at next follow up     Chronic rhinitis  -     azelastine (Astelin) 137 mcg (0.1 %) nasal spray; Administer 1 spray into each nostril 2 times a day. Use in each nostril as directed    Depressive disorder   -chronic, mood stable  -continue current dose of Duloxetine   -unable to decrease dose as pt develops worsening s/sx of depression/agitation     Patient stable. Will continue house calls NP program as pts mobility is limited and he has transportation issues. Frequent home follow up care has helped pt stay accountable with his health. Will see patient in 2 months for follow up with plans to update routine labs. Advised pt to contact house calls office with any acute concerns or medication needs.        Keyla Nuñez, DIANA-CNP

## 2024-08-18 DIAGNOSIS — E78.5 HYPERLIPIDEMIA, UNSPECIFIED: ICD-10-CM

## 2024-08-19 RX ORDER — ATORVASTATIN CALCIUM 80 MG/1
80 TABLET, FILM COATED ORAL DAILY
Qty: 90 TABLET | Refills: 3 | Status: SHIPPED | OUTPATIENT
Start: 2024-08-19

## 2024-10-06 DIAGNOSIS — I12.9 BENIGN HYPERTENSION WITH CHRONIC KIDNEY DISEASE: ICD-10-CM

## 2024-10-07 RX ORDER — CHLORTHALIDONE 25 MG/1
25 TABLET ORAL EVERY MORNING
Qty: 90 TABLET | Refills: 3 | Status: SHIPPED | OUTPATIENT
Start: 2024-10-07

## 2024-10-08 ENCOUNTER — TELEPHONE (OUTPATIENT)
Dept: PRIMARY CARE | Facility: CLINIC | Age: 74
End: 2024-10-08
Payer: MEDICARE

## 2024-10-08 NOTE — TELEPHONE ENCOUNTER
Pt expose to Covid last week. Now c/o nausea, diarrhea, lethargy, and chills. Haven't tested yet per son. They were notified yesterday that someone they were around tested positive yesterday. Son will test and call office back. I advised we will reschedule appt this Friday.

## 2024-10-11 ENCOUNTER — APPOINTMENT (OUTPATIENT)
Dept: PRIMARY CARE | Facility: CLINIC | Age: 74
End: 2024-10-11
Payer: MEDICARE

## 2024-10-18 ENCOUNTER — TELEPHONE (OUTPATIENT)
Dept: PRIMARY CARE | Facility: CLINIC | Age: 74
End: 2024-10-18
Payer: MEDICARE

## 2024-10-18 DIAGNOSIS — I10 ESSENTIAL HYPERTENSION: ICD-10-CM

## 2024-10-18 RX ORDER — LOSARTAN POTASSIUM 100 MG/1
100 TABLET ORAL DAILY
Qty: 90 TABLET | Refills: 3 | Status: SHIPPED | OUTPATIENT
Start: 2024-10-18

## 2024-10-21 ENCOUNTER — LAB (OUTPATIENT)
Dept: LAB | Facility: LAB | Age: 74
End: 2024-10-21
Payer: MEDICARE

## 2024-10-21 ENCOUNTER — OFFICE VISIT (OUTPATIENT)
Dept: PRIMARY CARE | Facility: CLINIC | Age: 74
End: 2024-10-21
Payer: MEDICARE

## 2024-10-21 VITALS
OXYGEN SATURATION: 95 % | WEIGHT: 270.95 LBS | SYSTOLIC BLOOD PRESSURE: 128 MMHG | HEART RATE: 61 BPM | RESPIRATION RATE: 18 BRPM | BODY MASS INDEX: 33.69 KG/M2 | DIASTOLIC BLOOD PRESSURE: 64 MMHG | HEIGHT: 75 IN | TEMPERATURE: 97.7 F

## 2024-10-21 DIAGNOSIS — I10 ESSENTIAL HYPERTENSION: ICD-10-CM

## 2024-10-21 DIAGNOSIS — I48.3 TYPICAL ATRIAL FLUTTER (MULTI): ICD-10-CM

## 2024-10-21 DIAGNOSIS — E11.9 TYPE 2 DIABETES MELLITUS WITHOUT COMPLICATION, WITHOUT LONG-TERM CURRENT USE OF INSULIN (MULTI): ICD-10-CM

## 2024-10-21 DIAGNOSIS — I10 ESSENTIAL HYPERTENSION: Primary | ICD-10-CM

## 2024-10-21 DIAGNOSIS — Z20.822 EXPOSURE TO COVID-19 VIRUS: ICD-10-CM

## 2024-10-21 DIAGNOSIS — E78.2 MIXED HYPERLIPIDEMIA: ICD-10-CM

## 2024-10-21 DIAGNOSIS — F32.A DEPRESSIVE DISORDER: ICD-10-CM

## 2024-10-21 DIAGNOSIS — G89.29 OTHER CHRONIC PAIN: ICD-10-CM

## 2024-10-21 DIAGNOSIS — K21.9 GASTRO-ESOPHAGEAL REFLUX DISEASE WITHOUT ESOPHAGITIS: ICD-10-CM

## 2024-10-21 LAB
ALBUMIN SERPL BCP-MCNC: 4 G/DL (ref 3.4–5)
ALP SERPL-CCNC: 82 U/L (ref 33–136)
ALT SERPL W P-5'-P-CCNC: 17 U/L (ref 10–52)
ANION GAP SERPL CALC-SCNC: 17 MMOL/L (ref 10–20)
AST SERPL W P-5'-P-CCNC: 13 U/L (ref 9–39)
BILIRUB SERPL-MCNC: 1.1 MG/DL (ref 0–1.2)
BUN SERPL-MCNC: 21 MG/DL (ref 6–23)
CALCIUM SERPL-MCNC: 9.6 MG/DL (ref 8.6–10.3)
CHLORIDE SERPL-SCNC: 101 MMOL/L (ref 98–107)
CHOLEST SERPL-MCNC: 128 MG/DL (ref 0–199)
CHOLESTEROL/HDL RATIO: 3.1
CO2 SERPL-SCNC: 28 MMOL/L (ref 21–32)
CREAT SERPL-MCNC: 1.48 MG/DL (ref 0.5–1.3)
EGFRCR SERPLBLD CKD-EPI 2021: 49 ML/MIN/1.73M*2
ERYTHROCYTE [DISTWIDTH] IN BLOOD BY AUTOMATED COUNT: 13.7 % (ref 11.5–14.5)
GLUCOSE SERPL-MCNC: 165 MG/DL (ref 74–99)
HCT VFR BLD AUTO: 42.9 % (ref 41–52)
HDLC SERPL-MCNC: 41.8 MG/DL
HGB BLD-MCNC: 13.6 G/DL (ref 13.5–17.5)
LDLC SERPL CALC-MCNC: 42 MG/DL
MCH RBC QN AUTO: 29.3 PG (ref 26–34)
MCHC RBC AUTO-ENTMCNC: 31.7 G/DL (ref 32–36)
MCV RBC AUTO: 93 FL (ref 80–100)
NON HDL CHOLESTEROL: 86 MG/DL (ref 0–149)
NRBC BLD-RTO: 0 /100 WBCS (ref 0–0)
PLATELET # BLD AUTO: 153 X10*3/UL (ref 150–450)
POTASSIUM SERPL-SCNC: 4 MMOL/L (ref 3.5–5.3)
PROT SERPL-MCNC: 6.3 G/DL (ref 6.4–8.2)
RBC # BLD AUTO: 4.64 X10*6/UL (ref 4.5–5.9)
SODIUM SERPL-SCNC: 142 MMOL/L (ref 136–145)
TRIGL SERPL-MCNC: 223 MG/DL (ref 0–149)
VLDL: 45 MG/DL (ref 0–40)
WBC # BLD AUTO: 6.4 X10*3/UL (ref 4.4–11.3)

## 2024-10-21 PROCEDURE — 3074F SYST BP LT 130 MM HG: CPT | Performed by: NURSE PRACTITIONER

## 2024-10-21 PROCEDURE — 1159F MED LIST DOCD IN RCRD: CPT | Performed by: NURSE PRACTITIONER

## 2024-10-21 PROCEDURE — 1125F AMNT PAIN NOTED PAIN PRSNT: CPT | Performed by: NURSE PRACTITIONER

## 2024-10-21 PROCEDURE — 36415 COLL VENOUS BLD VENIPUNCTURE: CPT

## 2024-10-21 PROCEDURE — 99349 HOME/RES VST EST MOD MDM 40: CPT | Performed by: NURSE PRACTITIONER

## 2024-10-21 PROCEDURE — 85027 COMPLETE CBC AUTOMATED: CPT

## 2024-10-21 PROCEDURE — 80061 LIPID PANEL: CPT

## 2024-10-21 PROCEDURE — 36415 COLL VENOUS BLD VENIPUNCTURE: CPT | Performed by: NURSE PRACTITIONER

## 2024-10-21 PROCEDURE — 83036 HEMOGLOBIN GLYCOSYLATED A1C: CPT

## 2024-10-21 PROCEDURE — 80053 COMPREHEN METABOLIC PANEL: CPT

## 2024-10-21 PROCEDURE — 3078F DIAST BP <80 MM HG: CPT | Performed by: NURSE PRACTITIONER

## 2024-10-21 PROCEDURE — 1160F RVW MEDS BY RX/DR IN RCRD: CPT | Performed by: NURSE PRACTITIONER

## 2024-10-21 ASSESSMENT — PAIN SCALES - GENERAL: PAINLEVEL_OUTOF10: 7

## 2024-10-21 NOTE — PROGRESS NOTES
Subjective   Patient ID: Bhupinder Cortez is a 74 y.o. male who presents for Follow-up (Routine 2 month follow up, labs ).    Visit for 75 y/o male seen today in private home, alone for routine follow up of chronic medical conditions. Pt is due for routine labs today. PMHx HTN, Hyperlipidemia, CAD, CHF, Atrial Flutter, GERD, DM, postherpatic neuralgia, anemia, chronic pain, iron deficiency anemia, macular degeneration, depression.     Pt was standing at the doorway upon provider arrival. He was able to ambulate into the house and is sitting at dining room table this afternoon. Pt is alert, oriented, able to answer all questions regarding his health. He lives with his son Bhupinder and daughter in law Nathalia. Pt does not drive or have a vehicle and endorses chronic pain with limited mobility making it difficult to get out for medical appointments. Pt does go out to the bank with his son once monthly. Otherwise, he is home bound. Pt is able to manage his own medications. He recently started using a pill box as he was having a difficult time remembering if he took his medications or not. He does admit that he forgot to take his medications once in the past week. Pt is able to perform his ADLs but does require assistance with meal preparation. He prepares simple meals for himself, mostly using the microwave. Pt denies appetite changes, signs of weight loss, abdominal pain, nausea, vomiting. Denies bowel or bladder concerns. Pt did have a recent COVID exposure and developed sore throat, congestion with cough and fatigue. He took a home COVID test that was negative. Reports that all of his symptoms have resolved. Pt denies fever, chills, chest pain, shortness of breath, cough or wheezing Pt is ambulatory without assistive device. He denies recent fall or injury.      DM-  patient continues on Actos. He has tried many diabetic medications in the past but was unable to tolerate them. Last A1c was much improved at 7.5. Pt  has been trying to make healthier diet changes. He uses a sugar substitute and has been trying to lower his carb/junk food intake.      HTN- patient does not follow with cardiology. He does not routinely monitor his BP or weight. Pt denies headaches, dizziness, blurry vision, chest pain, palpitations or obvious signs of weight gain.      CHF- patient endorses shortness of breath with exertion but denies feeling short of breath at rest. He denies cough, wheezing, palpitations, edema, or weight gain.      Home Visit:          Medically necessary due to: patient has limited support systems to help the patient attend office visits, Illness or condition that results in activity lmitation or restriction that impacts the ability to leave home such as:, unsteady gait/poor balance         Current Outpatient Medications:     acetaminophen (Tylenol) 500 mg tablet, Take 2 tablets (1,000 mg) by mouth once daily., Disp: , Rfl:     apixaban (Eliquis) 5 mg tablet, TAKE ONE (1) TABLET BY MOUTH TWICE DAILY WITH OR WITHOUT FOOD., Disp: 60 tablet, Rfl: 11    atorvastatin (Lipitor) 80 mg tablet, Take 1 tablet (80 mg) by mouth once daily., Disp: 90 tablet, Rfl: 3    azelastine (Astelin) 137 mcg (0.1 %) nasal spray, Administer 1 spray into each nostril 2 times a day. Use in each nostril as directed, Disp: 30 mL, Rfl: 2    buPROPion XL (Wellbutrin XL) 150 mg 24 hr tablet, TAKE 1 TABLET BY MOUTH EVERY DAY IN THE MORNING FOR 90 DAYS, Disp: 90 tablet, Rfl: 3    chlorthalidone (Hygroton) 25 mg tablet, TAKE 1 TABLET (25 MG) BY MOUTH ONCE DAILY IN THE MORNING., Disp: 90 tablet, Rfl: 3    DULoxetine (Cymbalta) 30 mg DR capsule, Take 1 capsule (30 mg) by mouth once daily. TAKE 1 CAPSULE BY MOUTH EVERY DAY WITH 60 MG CAPSULE, Disp: 90 capsule, Rfl: 3    DULoxetine (Cymbalta) 60 mg DR capsule, Take 1 capsule (60 mg) by mouth once daily. TAKE 1 CAPSULE BY MOUTH EVERY DAY WITH 30MG CAPSULE, Disp: 90 capsule, Rfl: 3    fish oil (Omega-3) 60- mg  capsule, Take 1 capsule (500 mg) by mouth once daily., Disp: 30 capsule, Rfl: 11    furosemide (Lasix) 20 mg tablet, Take 1 tablet (20 mg) by mouth once daily., Disp: 90 tablet, Rfl: 3    losartan (Cozaar) 100 mg tablet, TAKE 1 TABLET BY MOUTH EVERY DAY, Disp: 90 tablet, Rfl: 3    metoprolol tartrate (Lopressor) 100 mg tablet, Take 1 tablet (100 mg) by mouth 2 times a day., Disp: 180 tablet, Rfl: 3    multivitamin with minerals iron-free (multivit-iron-minerals-folic acid), Take 1 tablet by mouth once daily., Disp: , Rfl:     nystatin (Mycostatin) 100,000 unit/gram powder, Apply 1 Application topically 2 times a day. Externally to left axilla as needed, Disp: , Rfl:     nystatin (Mycostatin) cream, Apply 1 Application topically 2 times a day as needed., Disp: , Rfl:     pantoprazole (ProtoNix) 20 mg EC tablet, Take 1 tablet (20 mg) by mouth once daily., Disp: 90 tablet, Rfl: 3    pioglitazone (Actos) 15 mg tablet, Take 1 tablet (15 mg) by mouth once daily., Disp: 90 tablet, Rfl: 3    potassium chloride CR (Klor-Con M20) 20 mEq ER tablet, Take 1 tablet (20 mEq) by mouth 2 times a day., Disp: 180 tablet, Rfl: 3     Review of Systems  Constitutional: Negative for appetite change, chills, fever, unexplained weight loss.   HENT: Positive for chronic nasal drainage. Negative for trouble swallowing.    Respiratory: Positive for shortness of breath on exertion. Negative for cough, wheezing.    Cardiovascular: Negative for chest pain, palpitations, leg swelling.   Gastrointestinal: Negative for abdominal pain, constipation, diarrhea, nausea and vomiting.   Endocrine: Positive for diabetes  Genitourinary: Negative for difficulty urinating.   Musculoskeletal: Positive for arthralgias, chronic knee pain, back pain, unsteady gait, difficulty with balance  Neurological:  Negative for dizziness and light-headedness.   Hematological:  Does not bruise/bleed easily.   Psychiatric/Behavioral: Positive for depression    Objective   BP  "128/64 (BP Location: Left arm, Patient Position: Sitting, BP Cuff Size: Adult)   Pulse 61   Temp 36.5 °C (97.7 °F) (Temporal)   Resp 18   Ht 1.905 m (6' 3\")   Wt 123 kg (270 lb 15.1 oz)   SpO2 95%   BMI 33.87 kg/m²     Physical Exam  Constitutional: Negative for appetite change, chills, fever, unexplained weight loss.   HENT: Positive for nasal drainage. Negative for trouble swallowing.    Respiratory:  Positive for shortness of breath on exertion, occasional cough. Negative for wheezing.    Cardiovascular:  Negative for chest pain and palpitations.   Gastrointestinal: Negative for abdominal pain, constipation, diarrhea, nausea and vomiting.   Endocrine: Positive for diabetes  Genitourinary:  Negative for difficulty urinating.   Musculoskeletal:  Positive for arthralgias, chronic knee pain, back pain, unsteady gait, difficulty with balance  Neurological:  Negative for dizziness and light-headedness.   Hematological:  Does not bruise/bleed easily.   Psychiatric/Behavioral: Positive for depression    Assessment/Plan   Diagnoses and all orders for this visit:  Essential hypertension  -     CBC; Future  -     Comprehensive metabolic panel; Future  -chronic, vitals stable on exam  -continue Losartan, Metoprolol, Chlorthalidone     Mixed hyperlipidemia  -     Lipid panel; Future  -chronic, managed with Atorvastatin, Fish Oil     Typical atrial flutter (Multi)  -chronic, stable, HR regular, rate controlled  -continue Eliquis, Metoprolol  -monitor for increased s/sx of bruising/ bleeding     Type 2 diabetes mellitus without complication, without long-term current use of insulin (Multi)  -     Hemoglobin A1c; Future  -chronic, managed with Actos  -unable to tolerate any other PO medications  -does not want any insulin or injectables   -does not routinely monitor glucose levels   -will check A1c today    Gastro-esophageal reflux disease without esophagitis  -chronic, stable, no current GI concerns  -continue " pantoprazole     Other chronic pain  -chronic, generalized aches/pains  -previously followed with pain management, declines to reestablish care  -continue Tylenol as needed     Exposure to COVID-19 virus  -Acute cough/congestion/sore throat- symptoms resolved     Depressive disorder   -chronic, mood stable  -continue current dose of Duloxetine   -unable to decrease dose as pt becomes agitated with signs of worsening depression     Patient stable. Will continue house calls NP program as pts mobility is limited and he does not drive/has transportation difficulty. Frequent home follow up care has helped pt stay accountable with his health and has improved his medication compliance/lab results. Advised pt to contact house calls office with any acute concerns or medication needs.     Routine labs obtained in home- CBC, CMP, Lipid panel, A1c level- pt tolerated well        DIANA Pena-CNP

## 2024-10-21 NOTE — TELEPHONE ENCOUNTER
Patient son calling office to confirm patient visit with JOSE EDUARDO Nuñez NP for today.  Address and insurance confirmed. COVID screening negative, verbal consent obtained while on the phone with this nurse. Appointment confirmed.

## 2024-10-22 ENCOUNTER — TELEPHONE (OUTPATIENT)
Dept: PRIMARY CARE | Facility: CLINIC | Age: 74
End: 2024-10-22
Payer: MEDICARE

## 2024-10-22 LAB
EST. AVERAGE GLUCOSE BLD GHB EST-MCNC: 169 MG/DL
HBA1C MFR BLD: 7.5 %

## 2024-10-23 DIAGNOSIS — I48.92 ATRIAL FLUTTER, UNSPECIFIED TYPE (MULTI): ICD-10-CM

## 2024-10-23 RX ORDER — DULOXETIN HYDROCHLORIDE 60 MG/1
60 CAPSULE, DELAYED RELEASE ORAL DAILY
Qty: 90 CAPSULE | Refills: 3 | Status: SHIPPED | OUTPATIENT
Start: 2024-10-23 | End: 2024-10-25

## 2024-10-25 DIAGNOSIS — I48.92 ATRIAL FLUTTER, UNSPECIFIED TYPE (MULTI): ICD-10-CM

## 2024-10-25 RX ORDER — DULOXETIN HYDROCHLORIDE 60 MG/1
60 CAPSULE, DELAYED RELEASE ORAL DAILY
Qty: 90 CAPSULE | Refills: 3 | Status: SHIPPED | OUTPATIENT
Start: 2024-10-25

## 2024-11-04 DIAGNOSIS — I48.92 ATRIAL FLUTTER, UNSPECIFIED TYPE (MULTI): ICD-10-CM

## 2024-11-04 RX ORDER — DULOXETIN HYDROCHLORIDE 30 MG/1
30 CAPSULE, DELAYED RELEASE ORAL DAILY
Qty: 90 CAPSULE | Refills: 3 | Status: SHIPPED | OUTPATIENT
Start: 2024-11-04

## 2024-11-04 RX ORDER — APIXABAN 5 MG/1
TABLET, FILM COATED ORAL
Qty: 60 TABLET | Refills: 11 | Status: SHIPPED | OUTPATIENT
Start: 2024-11-04

## 2024-11-14 DIAGNOSIS — E11.65 TYPE 2 DIABETES MELLITUS WITH HYPERGLYCEMIA (MULTI): ICD-10-CM

## 2024-11-14 DIAGNOSIS — K21.9 GASTRO-ESOPHAGEAL REFLUX DISEASE WITHOUT ESOPHAGITIS: ICD-10-CM

## 2024-11-14 DIAGNOSIS — I10 ESSENTIAL (PRIMARY) HYPERTENSION: ICD-10-CM

## 2024-11-14 RX ORDER — METOPROLOL TARTRATE 100 MG/1
100 TABLET ORAL 2 TIMES DAILY
Qty: 180 TABLET | Refills: 3 | Status: SHIPPED | OUTPATIENT
Start: 2024-11-14

## 2024-11-14 RX ORDER — PANTOPRAZOLE SODIUM 20 MG/1
20 TABLET, DELAYED RELEASE ORAL DAILY
Qty: 90 TABLET | Refills: 3 | Status: SHIPPED | OUTPATIENT
Start: 2024-11-14

## 2024-11-14 RX ORDER — PIOGLITAZONEHYDROCHLORIDE 15 MG/1
15 TABLET ORAL DAILY
Qty: 90 TABLET | Refills: 3 | Status: SHIPPED | OUTPATIENT
Start: 2024-11-14

## 2024-12-09 ENCOUNTER — TELEPHONE (OUTPATIENT)
Dept: PRIMARY CARE | Facility: CLINIC | Age: 74
End: 2024-12-09
Payer: MEDICARE

## 2024-12-10 ENCOUNTER — OFFICE VISIT (OUTPATIENT)
Dept: PRIMARY CARE | Facility: CLINIC | Age: 74
End: 2024-12-10
Payer: MEDICARE

## 2024-12-10 VITALS
HEART RATE: 59 BPM | SYSTOLIC BLOOD PRESSURE: 148 MMHG | TEMPERATURE: 98.4 F | DIASTOLIC BLOOD PRESSURE: 78 MMHG | OXYGEN SATURATION: 99 %

## 2024-12-10 DIAGNOSIS — I10 ESSENTIAL HYPERTENSION: ICD-10-CM

## 2024-12-10 DIAGNOSIS — I25.10 ATHEROSCLEROSIS OF NATIVE CORONARY ARTERY OF NATIVE HEART WITHOUT ANGINA PECTORIS: ICD-10-CM

## 2024-12-10 DIAGNOSIS — B02.29 POSTHERPETIC NEURALGIA: Primary | ICD-10-CM

## 2024-12-10 DIAGNOSIS — I48.3 TYPICAL ATRIAL FLUTTER (MULTI): ICD-10-CM

## 2024-12-10 DIAGNOSIS — K57.90 DIVERTICULOSIS OF INTESTINE WITHOUT PERFORATION OR ABSCESS WITHOUT BLEEDING: ICD-10-CM

## 2024-12-10 DIAGNOSIS — E78.2 MIXED HYPERLIPIDEMIA: ICD-10-CM

## 2024-12-10 DIAGNOSIS — D64.9 ANEMIA, UNSPECIFIED TYPE: ICD-10-CM

## 2024-12-10 DIAGNOSIS — I50.32 CHRONIC DIASTOLIC CONGESTIVE HEART FAILURE: ICD-10-CM

## 2024-12-10 DIAGNOSIS — K21.9 GASTRO-ESOPHAGEAL REFLUX DISEASE WITHOUT ESOPHAGITIS: ICD-10-CM

## 2024-12-10 DIAGNOSIS — F32.A DEPRESSIVE DISORDER: ICD-10-CM

## 2024-12-10 DIAGNOSIS — N18.31 STAGE 3A CHRONIC KIDNEY DISEASE (MULTI): ICD-10-CM

## 2024-12-10 DIAGNOSIS — E11.9 TYPE 2 DIABETES MELLITUS WITHOUT COMPLICATION, WITHOUT LONG-TERM CURRENT USE OF INSULIN (MULTI): ICD-10-CM

## 2024-12-10 PROCEDURE — 1160F RVW MEDS BY RX/DR IN RCRD: CPT

## 2024-12-10 PROCEDURE — 99349 HOME/RES VST EST MOD MDM 40: CPT

## 2024-12-10 PROCEDURE — 3078F DIAST BP <80 MM HG: CPT

## 2024-12-10 PROCEDURE — 3077F SYST BP >= 140 MM HG: CPT

## 2024-12-10 PROCEDURE — 1159F MED LIST DOCD IN RCRD: CPT

## 2024-12-10 RX ORDER — LIDOCAINE 50 MG/G
2 PATCH TOPICAL DAILY
Qty: 60 PATCH | Refills: 3 | Status: SHIPPED | OUTPATIENT
Start: 2024-12-10 | End: 2025-12-10

## 2024-12-10 ASSESSMENT — ENCOUNTER SYMPTOMS
HEADACHES: 0
DIZZINESS: 1
BLOOD IN STOOL: 0
CHILLS: 0
SORE THROAT: 1
POLYPHAGIA: 0
PALPITATIONS: 0
ACTIVITY CHANGE: 1
HEMATURIA: 0
SLEEP DISTURBANCE: 1
CHEST TIGHTNESS: 0
NUMBNESS: 1
SEIZURES: 0
BRUISES/BLEEDS EASILY: 0
DIFFICULTY URINATING: 0
WHEEZING: 0
ABDOMINAL PAIN: 0
WOUND: 0
CONSTIPATION: 1
FREQUENCY: 0
NAUSEA: 0
UNEXPECTED WEIGHT CHANGE: 0
DYSURIA: 0
POLYDIPSIA: 0
FEVER: 0
AGITATION: 0
RHINORRHEA: 0
TREMORS: 0
NERVOUS/ANXIOUS: 0
CONFUSION: 0
TROUBLE SWALLOWING: 0
ARTHRALGIAS: 1
FATIGUE: 1
DIARRHEA: 1
FLANK PAIN: 0
VOMITING: 0
SHORTNESS OF BREATH: 0
SPEECH DIFFICULTY: 0
DIAPHORESIS: 0
COUGH: 0
ABDOMINAL DISTENTION: 0
SINUS PAIN: 0
APPETITE CHANGE: 0
DYSPHORIC MOOD: 1
BACK PAIN: 1
SINUS PRESSURE: 0
LIGHT-HEADEDNESS: 1

## 2024-12-10 NOTE — PROGRESS NOTES
"Subjective   Patient ID: Bhupinder Cortez is a 74 y.o. male who presents for Follow-up (Chronic medical conditions/).    HPI   Bhupinder is seen in his private home for routine follow up for chronic medical conditions. He is alert, oriented to person, place, time, and situation. He is able to answer all questions regarding his health.     PMH: HTN, HLD, CAD, Atrial Flutter (apixaban), CHF, CKD3a, GERD, DM2, anemia, depression, postherpetic neuralgia, macular degeneration, and depression    Bhupinder continues to live with his sone and daughter in law. He does not drive anymore d/t declining vision. He has chronic pain and mobility issues, he rarely leaves the home. Bhupinder is independent with his ADLs and is able to prepare simple meals for himself. His family does grocery shopping, household chores, and more elaborate meal prep. Bhupinder is able to manage his own medications and consistently fills his pill box correctly. The only issue he brings to my attention is continued neuropathic pain in his left side r/t previous shingles. He does have both a cane and a walker but rarely uses either in the home, rather he uses furniture or the wall to steady himself.  He states he has not had any \"actual falls, but I am a little unsteady at times\"    HTN/Aflutter/CHF - He does not monitor BP or weight, does not feel he has gained weight. Endorses taking his medications as prescribed. Denies headache, dizziness, blurry vision, chest pain, palpitations or swelling. No active bleeding reported. He does not follow with cardiology.    DM2 - He has been unable to tolerate many oral antidiabetic medications. Does not check his BG at home. He has been able to cut down on his carbs, but admits it is hard to maintain, especially during the holiday season. He denies hypo or hyper glycemic symptoms. He is complaint with pioglitazone.    Home Visit: Medically necessary due to unsteady gait/poor balance, and Illness or condition that " results in activity limitation or restriction that impacts the ability to leave home such as: equipment and /or human assistance needed to safely leave the home, patient has limited support systems to help the patient attend office visits, the office visit would require excessive physical effort or pain for the patient.       Current Outpatient Medications:     acetaminophen (Tylenol) 500 mg tablet, Take 2 tablets (1,000 mg) by mouth once daily., Disp: , Rfl:     atorvastatin (Lipitor) 80 mg tablet, Take 1 tablet (80 mg) by mouth once daily., Disp: 90 tablet, Rfl: 3    azelastine (Astelin) 137 mcg (0.1 %) nasal spray, Administer 1 spray into each nostril 2 times a day. Use in each nostril as directed, Disp: 30 mL, Rfl: 2    buPROPion XL (Wellbutrin XL) 150 mg 24 hr tablet, TAKE 1 TABLET BY MOUTH EVERY DAY IN THE MORNING FOR 90 DAYS, Disp: 90 tablet, Rfl: 3    chlorthalidone (Hygroton) 25 mg tablet, TAKE 1 TABLET (25 MG) BY MOUTH ONCE DAILY IN THE MORNING., Disp: 90 tablet, Rfl: 3    DULoxetine (Cymbalta) 30 mg DR capsule, TAKE 1 CAPSULE (30 MG) BY MOUTH ONCE DAILY. TAKE 1 CAPSULE BY MOUTH EVERY DAY WITH 60 MG CAPSULE, Disp: 90 capsule, Rfl: 3    DULoxetine (Cymbalta) 60 mg DR capsule, TAKE 1 CAPSULE (60 MG) BY MOUTH ONCE DAILY. TAKE 1 CAPSULE BY MOUTH EVERY DAY WITH 30MG CAPSULE, Disp: 90 capsule, Rfl: 3    Eliquis 5 mg tablet, TAKE ONE (1) TABLET BY MOUTH TWICE DAILY WITH OR WITHOUT FOOD., Disp: 60 tablet, Rfl: 11    fish oil (Omega-3) 60- mg capsule, Take 1 capsule (500 mg) by mouth once daily., Disp: 30 capsule, Rfl: 11    furosemide (Lasix) 20 mg tablet, Take 1 tablet (20 mg) by mouth once daily., Disp: 90 tablet, Rfl: 3    losartan (Cozaar) 100 mg tablet, TAKE 1 TABLET BY MOUTH EVERY DAY, Disp: 90 tablet, Rfl: 3    metoprolol tartrate (Lopressor) 100 mg tablet, TAKE 1 TABLET BY MOUTH TWICE A DAY, Disp: 180 tablet, Rfl: 3    multivitamin with minerals iron-free (multivit-iron-minerals-folic acid), Take 1  tablet by mouth once daily., Disp: , Rfl:     nystatin (Mycostatin) 100,000 unit/gram powder, Apply 1 Application topically 2 times a day. Externally to left axilla as needed, Disp: , Rfl:     nystatin (Mycostatin) cream, Apply 1 Application topically 2 times a day as needed., Disp: , Rfl:     pantoprazole (ProtoNix) 20 mg EC tablet, TAKE 1 TABLET BY MOUTH EVERY DAY, Disp: 90 tablet, Rfl: 3    pioglitazone (Actos) 15 mg tablet, TAKE 1 TABLET BY MOUTH EVERY DAY, Disp: 90 tablet, Rfl: 3    potassium chloride CR (Klor-Con M20) 20 mEq ER tablet, Take 1 tablet (20 mEq) by mouth 2 times a day., Disp: 180 tablet, Rfl: 3     Review of Systems   Constitutional:  Positive for activity change and fatigue. Negative for appetite change, chills, diaphoresis, fever and unexpected weight change.        Felt down when stuck inside d/t the weather (lots of snow), feeling better now that he can get outside a little bit. He always feel a little more tired during this time of year.   HENT:  Positive for sore throat. Negative for congestion, dental problem, hearing loss, rhinorrhea, sinus pressure, sinus pain, sneezing and trouble swallowing.         Sore throat in the morning d/t increased heat (furnace), resolves during the day.   Eyes:  Positive for visual disturbance.        Has stopped driving for some time, d/t loss of peripheral vision.   Respiratory:  Negative for cough, chest tightness, shortness of breath and wheezing.    Cardiovascular:  Negative for chest pain, palpitations and leg swelling.   Gastrointestinal:  Positive for constipation and diarrhea. Negative for abdominal distention, abdominal pain, blood in stool, nausea and vomiting.        BM every three days, intermittent constipation/diarrhea (spastic)   Endocrine: Positive for cold intolerance. Negative for heat intolerance, polydipsia, polyphagia and polyuria.   Genitourinary:  Negative for difficulty urinating, dysuria, flank pain, frequency, hematuria and urgency.    Musculoskeletal:  Positive for arthralgias, back pain and gait problem.        Has a cane and a walker. Doesn't always use because it is awkward inside the house. No reported falls.    Skin:  Negative for rash and wound.        Lots of moles on neck, no significant changes in 11 years   Neurological:  Positive for dizziness, light-headedness and numbness. Negative for tremors, seizures, syncope, speech difficulty and headaches.        Dizziness/light headedness with position changes that resolve after a few moments.  Left fingers/hand is numb/tingly, radiates up left arm   Hematological:  Does not bruise/bleed easily.   Psychiatric/Behavioral:  Positive for dysphoric mood and sleep disturbance. Negative for agitation, behavioral problems, confusion, self-injury and suicidal ideas. The patient is not nervous/anxious.         Sleep is fragmented, to use bathroom. Usually gets up about 3 am, he was a nightshift worker for years.         Objective   /78   Pulse 59   Temp 36.9 °C (98.4 °F)   SpO2 99% Comment: room air  Lab Results   Component Value Date    HGBA1C 7.5 (H) 10/21/2024      Lab Results   Component Value Date    WBC 6.4 10/21/2024    HGB 13.6 10/21/2024    HCT 42.9 10/21/2024    MCV 93 10/21/2024     10/21/2024      Lab Results   Component Value Date    GLUCOSE 165 (H) 10/21/2024    CALCIUM 9.6 10/21/2024     10/21/2024    K 4.0 10/21/2024    CO2 28 10/21/2024     10/21/2024    BUN 21 10/21/2024    CREATININE 1.48 (H) 10/21/2024      Lab Results   Component Value Date    INR 1.9 (H) 11/09/2018    INR 5.5 (H) 05/22/2018    PROTIME 20.7 (H) 11/09/2018    PROTIME 58.2 (H) 05/22/2018          Physical Exam  Constitutional:       General: He is not in acute distress.     Appearance: Normal appearance. He is obese. He is not toxic-appearing.   HENT:      Head: Normocephalic.      Nose: No congestion or rhinorrhea.      Mouth/Throat:      Mouth: Mucous membranes are moist.      Pharynx:  Oropharynx is clear. No oropharyngeal exudate or posterior oropharyngeal erythema.   Eyes:      General: No scleral icterus.     Extraocular Movements: Extraocular movements intact.      Conjunctiva/sclera: Conjunctivae normal.      Pupils: Pupils are equal, round, and reactive to light.   Cardiovascular:      Rate and Rhythm: Regular rhythm. Bradycardia present.      Pulses: Normal pulses.      Heart sounds: No murmur heard.  Pulmonary:      Effort: Pulmonary effort is normal. No respiratory distress.      Breath sounds: No wheezing or rales.   Abdominal:      General: Bowel sounds are normal. There is no distension.      Palpations: Abdomen is soft.      Tenderness: There is no abdominal tenderness. There is no guarding.   Genitourinary:     Comments: Did not assess  Musculoskeletal:         General: Normal range of motion.      Cervical back: Neck supple.      Right lower leg: No edema.      Left lower leg: No edema.   Skin:     General: Skin is warm and dry.      Capillary Refill: Capillary refill takes less than 2 seconds.      Coloration: Skin is not jaundiced.      Findings: No bruising, erythema or rash.      Comments: Scattered nevi on BUE and left neck   Neurological:      General: No focal deficit present.      Mental Status: He is alert and oriented to person, place, and time.   Psychiatric:         Mood and Affect: Mood normal.         Behavior: Behavior normal.         Thought Content: Thought content normal.         Judgment: Judgment normal.         Assessment/Plan   Gastrointestinal and Abdominal  GERD/Diverticulosis - chronic. Reports intermittent constipation/diarrhea. No abdominal pain/cramping.  -continue pantoprazole 20 mg  -encourage hydration with water and high fiber diet  -advised OTC probiotic supplement    Genitourinary and Reproductive  CKD3a - chronic, baseline creatinine 1.46 mg/dL. Does not follow with nephrology.  -renally dose medications  -avoid nephrotoxic agents  -routinely  monitor renal function    Endocrine/Metabolic  DM2 -   Lab Results   Component Value Date    HGBA1C 7.5 (H) 10/21/2024   -continue pioglitazone 15 mg  -continue to limit carbs  -physical activity as tolerated  -routinely monitor a1c    Cardiac and Vasculature  HTN/HLD/Aflutter/CHF - chronic, BP controlled. Does not follow with cardiology.  Lab Results   Component Value Date    CHOL 128 10/21/2024    CHOL 155 12/12/2023    CHOL 147 01/30/2023     Lab Results   Component Value Date    HDL 41.8 10/21/2024    HDL 40.6 12/12/2023    HDL 41 01/30/2023     Lab Results   Component Value Date    LDLCALC 42 10/21/2024    LDLCALC 48 12/12/2023    LDLCALC 53 (L) 01/30/2023     Lab Results   Component Value Date    TRIG 223 (H) 10/21/2024    TRIG 331 (H) 12/12/2023    TRIG 266 (H) 01/30/2023   May 2016 TTE:  There is normal left ventricular systolic function.   Estimated ejection fraction is 55-59%.   The left atrial size is mildly dilated.   No evidence of aortic regurgitation.   No evidence of aortic valve stenosis.   No mitral regurgitation.   A trace of tricuspid regurgitation.  -continue apixaban 5 mg BID, atorvastatin 80 mg, chlorthalidone 25 mg, fish oil 60- mg, furosemide 20 mg, losartan 100 mg, metoprolol tartrate 100 mg BID, and potassium chloride 20 mEq BID  -continue heart healthy diet  -notify provider if weight gain of more than 3 lbs in one day or 5 lbs in one week  -routinely monitor cbc, renal function, and lipids    Hematology and Neoplasia  Anemia - chronic, stable.   -continue MVI   -routinely monitor cbc    Mental Health  Depression - chronic, mood is stable.  -continue buproprion  mg and duloxetine 90 mg    Neuro  Postherpetic neuralgia - history of herpes zoster, left side residual pain.  -continue acetaminophen 1 gm   -initiated lidocaine 5% patches, 12 hrs on/12 hrs off    Patient is stable. Will continue with [facility] NP visits as patient does not drive and has mobility issues making it  difficult to leave the home.              Kala Castellon, APRN-CNP

## 2024-12-16 PROBLEM — R06.00 DYSPNEA: Status: RESOLVED | Noted: 2023-10-20 | Resolved: 2024-12-16

## 2024-12-16 PROBLEM — E63.8 NUTRITIONAL INTAKE LESS THAN BODY REQUIREMENTS: Status: RESOLVED | Noted: 2023-10-20 | Resolved: 2024-12-16

## 2024-12-16 PROBLEM — I13.0: Status: RESOLVED | Noted: 2023-10-20 | Resolved: 2024-12-16

## 2024-12-16 PROBLEM — F32.9 MAJOR DEPRESSIVE DISORDER, SINGLE EPISODE, UNSPECIFIED: Status: RESOLVED | Noted: 2023-10-20 | Resolved: 2024-12-16

## 2024-12-16 PROBLEM — K92.2 LOWER GASTROINTESTINAL HEMORRHAGE: Status: RESOLVED | Noted: 2023-10-20 | Resolved: 2024-12-16

## 2024-12-16 PROBLEM — R60.9 EDEMA: Status: RESOLVED | Noted: 2023-10-20 | Resolved: 2024-12-16

## 2024-12-16 PROBLEM — R06.89 IMPAIRED GAS EXCHANGE: Status: RESOLVED | Noted: 2023-10-20 | Resolved: 2024-12-16

## 2024-12-16 PROBLEM — R68.89 ACTIVITY INTOLERANCE: Status: RESOLVED | Noted: 2023-10-20 | Resolved: 2024-12-16

## 2024-12-16 PROBLEM — R07.9 CHEST PAIN: Status: RESOLVED | Noted: 2023-10-20 | Resolved: 2024-12-16

## 2024-12-16 PROBLEM — Q20.8 ABNORMALITY OF LEFT ATRIAL APPENDAGE (HHS-HCC): Status: RESOLVED | Noted: 2023-10-20 | Resolved: 2024-12-16

## 2024-12-16 PROBLEM — E87.8 FLUID VOLUME DISORDER: Status: RESOLVED | Noted: 2023-10-20 | Resolved: 2024-12-16

## 2024-12-16 PROBLEM — Z86.39 HISTORY OF UNCONTROLLED DIABETES: Status: RESOLVED | Noted: 2023-10-17 | Resolved: 2024-12-16

## 2024-12-16 PROBLEM — Z86.2 HISTORY OF IRON DEFICIENCY ANEMIA: Status: RESOLVED | Noted: 2023-10-20 | Resolved: 2024-12-16

## 2024-12-16 PROBLEM — E78.5 DYSLIPIDEMIA: Status: RESOLVED | Noted: 2023-10-20 | Resolved: 2024-12-16

## 2024-12-16 PROBLEM — N18.31 STAGE 3A CHRONIC KIDNEY DISEASE (MULTI): Status: ACTIVE | Noted: 2023-10-17

## 2024-12-16 NOTE — ASSESSMENT & PLAN NOTE
Postherpetic neuralgia - history of herpes zoster, left side residual pain.  -continue acetaminophen 1 gm   -initiated lidocaine 5% patches, 12 hrs on/12 hrs off

## 2024-12-16 NOTE — ASSESSMENT & PLAN NOTE
CKD3a - chronic, baseline creatinine 1.46 mg/dL. Does not follow with nephrology.  -renally dose medications  -avoid nephrotoxic agents  -routinely monitor renal function

## 2024-12-16 NOTE — ASSESSMENT & PLAN NOTE
GERD/Diverticulosis - chronic. Reports intermittent constipation/diarrhea. No abdominal pain/cramping.  -continue pantoprazole 20 mg  -encourage hydration with water and high fiber diet  -advised OTC probiotic supplement

## 2024-12-16 NOTE — ASSESSMENT & PLAN NOTE
HTN/HLD/Aflutter/CHF - chronic, BP controlled. Does not follow with cardiology.  Lab Results   Component Value Date    CHOL 128 10/21/2024    CHOL 155 12/12/2023    CHOL 147 01/30/2023     Lab Results   Component Value Date    HDL 41.8 10/21/2024    HDL 40.6 12/12/2023    HDL 41 01/30/2023     Lab Results   Component Value Date    LDLCALC 42 10/21/2024    LDLCALC 48 12/12/2023    LDLCALC 53 (L) 01/30/2023     Lab Results   Component Value Date    TRIG 223 (H) 10/21/2024    TRIG 331 (H) 12/12/2023    TRIG 266 (H) 01/30/2023   May 2016 TTE:  There is normal left ventricular systolic function.   Estimated ejection fraction is 55-59%.   The left atrial size is mildly dilated.   No evidence of aortic regurgitation.   No evidence of aortic valve stenosis.   No mitral regurgitation.   A trace of tricuspid regurgitation.  -continue apixaban 5 mg BID, atorvastatin 80 mg, chlorthalidone 25 mg, fish oil 60- mg, furosemide 20 mg, losartan 100 mg, metoprolol tartrate 100 mg BID, and potassium chloride 20 mEq BID  -continue heart healthy diet  -notify provider if weight gain of more than 3 lbs in one day or 5 lbs in one week  -routinely monitor cbc, renal function, and lipids

## 2024-12-16 NOTE — ASSESSMENT & PLAN NOTE
DM2 -   Lab Results   Component Value Date    HGBA1C 7.5 (H) 10/21/2024   -continue pioglitazone 15 mg  -continue to limit carbs  -physical activity as tolerated  -routinely monitor a1c

## 2024-12-19 DIAGNOSIS — F32.9 MAJOR DEPRESSIVE DISORDER, SINGLE EPISODE, UNSPECIFIED: ICD-10-CM

## 2024-12-19 RX ORDER — BUPROPION HYDROCHLORIDE 150 MG/1
TABLET ORAL
Qty: 90 TABLET | Refills: 0 | Status: SHIPPED | OUTPATIENT
Start: 2024-12-19

## 2025-01-13 DIAGNOSIS — F32.9 MAJOR DEPRESSIVE DISORDER, SINGLE EPISODE, UNSPECIFIED: ICD-10-CM

## 2025-01-13 DIAGNOSIS — I50.9 HEART FAILURE, UNSPECIFIED: ICD-10-CM

## 2025-01-13 RX ORDER — FUROSEMIDE 20 MG/1
20 TABLET ORAL DAILY
Qty: 90 TABLET | Refills: 3 | Status: SHIPPED | OUTPATIENT
Start: 2025-01-13

## 2025-01-13 RX ORDER — BUPROPION HYDROCHLORIDE 150 MG/1
TABLET ORAL
Qty: 90 TABLET | Refills: 0 | Status: SHIPPED | OUTPATIENT
Start: 2025-01-13

## 2025-02-10 ENCOUNTER — TELEPHONE (OUTPATIENT)
Dept: PRIMARY CARE | Facility: CLINIC | Age: 75
End: 2025-02-10
Payer: MEDICARE

## 2025-02-11 ENCOUNTER — OFFICE VISIT (OUTPATIENT)
Dept: PRIMARY CARE | Facility: CLINIC | Age: 75
End: 2025-02-11
Payer: MEDICARE

## 2025-02-11 VITALS
OXYGEN SATURATION: 97 % | RESPIRATION RATE: 17 BRPM | HEART RATE: 95 BPM | DIASTOLIC BLOOD PRESSURE: 80 MMHG | TEMPERATURE: 97.8 F | SYSTOLIC BLOOD PRESSURE: 142 MMHG

## 2025-02-11 DIAGNOSIS — G89.29 OTHER CHRONIC PAIN: ICD-10-CM

## 2025-02-11 DIAGNOSIS — E55.9 VITAMIN D DEFICIENCY: ICD-10-CM

## 2025-02-11 DIAGNOSIS — I48.3 TYPICAL ATRIAL FLUTTER (MULTI): ICD-10-CM

## 2025-02-11 DIAGNOSIS — K21.9 GASTRO-ESOPHAGEAL REFLUX DISEASE WITHOUT ESOPHAGITIS: ICD-10-CM

## 2025-02-11 DIAGNOSIS — B02.29 POSTHERPETIC NEURALGIA: ICD-10-CM

## 2025-02-11 DIAGNOSIS — I25.10 ATHEROSCLEROSIS OF NATIVE CORONARY ARTERY OF NATIVE HEART WITHOUT ANGINA PECTORIS: ICD-10-CM

## 2025-02-11 DIAGNOSIS — D64.9 ANEMIA, UNSPECIFIED TYPE: ICD-10-CM

## 2025-02-11 DIAGNOSIS — E11.9 TYPE 2 DIABETES MELLITUS WITHOUT COMPLICATION, WITHOUT LONG-TERM CURRENT USE OF INSULIN (MULTI): ICD-10-CM

## 2025-02-11 DIAGNOSIS — K57.90 DIVERTICULOSIS OF INTESTINE WITHOUT PERFORATION OR ABSCESS WITHOUT BLEEDING: ICD-10-CM

## 2025-02-11 DIAGNOSIS — I10 ESSENTIAL HYPERTENSION: Primary | ICD-10-CM

## 2025-02-11 DIAGNOSIS — I50.32 CHRONIC DIASTOLIC CONGESTIVE HEART FAILURE: ICD-10-CM

## 2025-02-11 DIAGNOSIS — E53.8 VITAMIN B12 DEFICIENCY: ICD-10-CM

## 2025-02-11 DIAGNOSIS — F32.A DEPRESSIVE DISORDER: ICD-10-CM

## 2025-02-11 DIAGNOSIS — Z12.5 SCREENING PSA (PROSTATE SPECIFIC ANTIGEN): ICD-10-CM

## 2025-02-11 DIAGNOSIS — N18.31 STAGE 3A CHRONIC KIDNEY DISEASE (MULTI): ICD-10-CM

## 2025-02-11 DIAGNOSIS — E78.2 MIXED HYPERLIPIDEMIA: ICD-10-CM

## 2025-02-11 NOTE — PROGRESS NOTES
"Subjective   Patient ID: Bhupinder Cortez is a 74 y.o. male who presents for Follow-up (Chronic medical conditions ).    HPI  Bhupinder is seen in his private home for routine follow up for chronic medical conditions. He is alert, oriented to person, place, time, and situation. He is able to answer all questions regarding his health.      PMH: HTN, HLD, CAD, Atrial Flutter (apixaban), CHF, CKD3a, GERD, DM2, anemia, depression, postherpetic neuralgia, macular degeneration, and depression     Bhupinder continues to live with his son and daughter in law. He does not drive anymore d/t declining vision. He has chronic pain and mobility issues, he rarely leaves the home. Bhupinder is independent with his ADLs and is able to prepare simple meals for himself. His family does grocery shopping, household chores, and more elaborate meal prep. Bhupinder reports that his son has been helping him fill his pill box.  Reports the neuropathic pain in his left side r/t previous shingles has greatly improved with the lidocaine 5% patch. He does have both a cane and a walker but rarely uses either in the home, rather he uses furniture or the wall to steady himself.  He states he was outside two days ago, getting the mail, the snow had not been shoveled. He went down on one knee and could not get up. Someone passing by in a car stopped and helped him up, he did not suffer any injury.   Bhupinder reports that he had covid about 3 weeks ago, \"my son brought it home and gave it to me\". Had cough/congestion, and overall malaise/weakness, was not evaluated by a healthcare professional. Reports that his cough and congestion have resolved but still with weakness. Not able to complete his PT exercises as easily, politely declines referral for home PT/OT services.      HTN/Aflutter/CHF - He does not monitor BP or weight, does not feel he has gained weight. Endorses taking his medications as prescribed. Denies headache, blurry vision, shortness of " breath, chest pain, palpitations or swelling. He does endorse some intermittent dizziness/lightheadedness with position changes, reports this resolves within moments. No active bleeding reported. He does not follow with cardiology currently.     DM2 - He has been unable to tolerate many oral antidiabetic medications. Does not check his BG at home. He has been able to cut down on his carbs, but admits it is hard to maintain, especially during the holiday season. He denies hypo or hyper glycemic symptoms. He is complaint with pioglitazone.       Home Visit: Medically necessary due to unsteady gait/poor balance, and Illness or condition that results in activity limitation or restriction that impacts the ability to leave home such as: equipment and /or human assistance needed to safely leave the home, patient has limited support systems to help the patient attend office visits, the office visit would require excessive physical effort or pain for the patient.       Current Outpatient Medications:     acetaminophen (Tylenol) 500 mg tablet, Take 2 tablets (1,000 mg) by mouth once daily., Disp: , Rfl:     atorvastatin (Lipitor) 80 mg tablet, Take 1 tablet (80 mg) by mouth once daily., Disp: 90 tablet, Rfl: 3    azelastine (Astelin) 137 mcg (0.1 %) nasal spray, Administer 1 spray into each nostril 2 times a day. Use in each nostril as directed, Disp: 30 mL, Rfl: 2    buPROPion XL (Wellbutrin XL) 150 mg 24 hr tablet, TAKE 1 TABLET BY MOUTH EVERY DAY IN THE MORNING FOR 90 DAYS, Disp: 90 tablet, Rfl: 0    chlorthalidone (Hygroton) 25 mg tablet, TAKE 1 TABLET (25 MG) BY MOUTH ONCE DAILY IN THE MORNING., Disp: 90 tablet, Rfl: 3    DULoxetine (Cymbalta) 30 mg DR capsule, TAKE 1 CAPSULE (30 MG) BY MOUTH ONCE DAILY. TAKE 1 CAPSULE BY MOUTH EVERY DAY WITH 60 MG CAPSULE, Disp: 90 capsule, Rfl: 3    DULoxetine (Cymbalta) 60 mg DR capsule, TAKE 1 CAPSULE (60 MG) BY MOUTH ONCE DAILY. TAKE 1 CAPSULE BY MOUTH EVERY DAY WITH 30MG CAPSULE,  Disp: 90 capsule, Rfl: 3    Eliquis 5 mg tablet, TAKE ONE (1) TABLET BY MOUTH TWICE DAILY WITH OR WITHOUT FOOD., Disp: 60 tablet, Rfl: 11    furosemide (Lasix) 20 mg tablet, TAKE 1 TABLET BY MOUTH EVERY DAY, Disp: 90 tablet, Rfl: 3    lidocaine (Lidoderm) 5 % patch, Place 2 patches over 12 hours on the skin once daily. Apply to painful area 12 hours per day, remove for 12 hours., Disp: 60 patch, Rfl: 3    losartan (Cozaar) 100 mg tablet, TAKE 1 TABLET BY MOUTH EVERY DAY, Disp: 90 tablet, Rfl: 3    metoprolol tartrate (Lopressor) 100 mg tablet, TAKE 1 TABLET BY MOUTH TWICE A DAY, Disp: 180 tablet, Rfl: 3    nystatin (Mycostatin) cream, Apply 1 Application topically 2 times a day as needed., Disp: , Rfl:     pantoprazole (ProtoNix) 20 mg EC tablet, TAKE 1 TABLET BY MOUTH EVERY DAY, Disp: 90 tablet, Rfl: 3    pioglitazone (Actos) 15 mg tablet, TAKE 1 TABLET BY MOUTH EVERY DAY, Disp: 90 tablet, Rfl: 3    potassium chloride CR (Klor-Con M20) 20 mEq ER tablet, Take 1 tablet (20 mEq) by mouth 2 times a day., Disp: 180 tablet, Rfl: 3    multivitamin with minerals iron-free (multivit-iron-minerals-folic acid), Take 1 tablet by mouth once daily., Disp: , Rfl:     nystatin (Mycostatin) 100,000 unit/gram powder, Apply 1 Application topically 2 times a day. Externally to left axilla as needed, Disp: , Rfl:      Review of Systems  Constitutional:  Positive for activity change and fatigue. Negative for appetite change, chills, diaphoresis, fever and unexpected weight change.        Felt down when stuck inside d/t the weather (lots of snow), feeling better now that he can get outside a little bit. He always feel a little more tired during this time of year.   HENT:  Positive for sore throat. Negative for congestion, dental problem, hearing loss, rhinorrhea, sinus pressure, sinus pain, sneezing and trouble swallowing.         Sore throat in the morning d/t increased heat (furnace), resolves during the day. 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   Eyes:  Positive for visual disturbance.        Has stopped driving for some time, d/t loss of peripheral vision.   Respiratory:  Negative for cough, chest tightness, shortness of breath and wheezing.    Cardiovascular:  Negative for chest pain, palpitations and leg swelling.   Gastrointestinal:  Positive for constipation and diarrhea. Negative for abdominal distention, abdominal pain, blood in stool, nausea and vomiting.        BM every three days, intermittent constipation/diarrhea (spastic)   Endocrine: Positive for cold intolerance. Negative for heat intolerance, polydipsia, polyphagia and polyuria.   Genitourinary:  Negative for difficulty urinating, dysuria, flank pain, frequency, hematuria and urgency.   Musculoskeletal:  Positive for arthralgias, back pain and gait problem.        Has a cane and a walker. Doesn't always use because it is awkward inside the house. No reported falls.    Skin:  Negative for rash and wound.        Lots of moles on neck, no significant changes in 11 years   Neurological:  Positive for dizziness, light-headedness and numbness. Negative for tremors, seizures, syncope, speech difficulty and headaches.        Dizziness/light headedness with position changes that resolve after a few moments.  Left fingers/hand is numb/tingly, radiates up left arm   Hematological:  Does not bruise/bleed easily.   Psychiatric/Behavioral:  Positive for dysphoric mood and sleep disturbance. Negative for agitation, behavioral problems, confusion, self-injury and suicidal ideas. The patient is not nervous/anxious.         Sleep is fragmented, to use bathroom. Usually gets up about 3 am, he was a nightshift worker for years.     Objective   /80   Pulse 95   Temp 36.6 °C (97.8 °F)   Resp 17   SpO2 97%   Lab Results   Component Value Date    HGBA1C 7.5 (H) 10/21/2024      Lab Results   Component Value Date    WBC 6.4 10/21/2024    HGB 13.6 10/21/2024    HCT  42.9 10/21/2024    MCV 93 10/21/2024     10/21/2024      Lab Results   Component Value Date    GLUCOSE 165 (H) 10/21/2024    CALCIUM 9.6 10/21/2024     10/21/2024    K 4.0 10/21/2024    CO2 28 10/21/2024     10/21/2024    BUN 21 10/21/2024    CREATININE 1.48 (H) 10/21/2024      Lab Results   Component Value Date    INR 1.9 (H) 11/09/2018    INR 5.5 (H) 05/22/2018    PROTIME 20.7 (H) 11/09/2018    PROTIME 58.2 (H) 05/22/2018      Physical Exam  Constitutional:       General: He is not in acute distress.     Appearance: Normal appearance. He is obese. He is not toxic-appearing.   Alert, well-groomed and well-nourished.  HENT:      Head: Normocephalic.      Nose: No congestion or rhinorrhea.      Mouth/Throat:      Mouth: Mucous membranes are moist.      Pharynx: Oropharynx is clear. No oropharyngeal exudate or posterior oropharyngeal erythema.   Eyes:      General: No scleral icterus.     Extraocular Movements: Extraocular movements intact.      Conjunctiva/sclera: Conjunctivae normal.      Pupils: Pupils are equal, round, and reactive to light.   Cardiovascular:      Rate and Rhythm: Regular rate and rhythm.      Pulses: Normal pulses.      Heart sounds: No murmur heard.  Pulmonary:      Effort: Pulmonary effort is normal. No respiratory distress.      Breath sounds: No wheezing or rales.   Abdominal:      General: Bowel sounds are normal. There is no distension.      Palpations: Abdomen is soft.      Tenderness: There is no abdominal tenderness. There is no guarding.   Genitourinary:     Comments: Did not assess  Musculoskeletal:         General: Normal range of motion.      Cervical back: Neck supple.      Right lower leg: No edema.      Left lower leg: No edema.   Skin:     General: Skin is warm and dry.      Capillary Refill: Capillary refill takes less than 2 seconds.      Coloration: Skin is not jaundiced.      Findings: No bruising, erythema or rash.      Comments: Scattered nevi on BUE and  left neck   Neurological:      General: No focal deficit present.      Mental Status: He is alert and oriented to person, place, and time.   Clear and coherent speech, MAEx4 with equal strength, 5/5.  Psychiatric:         Mood and Affect: Mood normal.         Behavior: Behavior normal.         Thought Content: Thought content normal.         Judgment: Judgment normal.      Assessment/Plan       F2F for chair lift - for the stairs (6 steps and then 8 steps, with landing in between-front door) to finished basement. Patient has impaired mobility d/t afib, CHF, chronic pain, overall functional decline and frequent falls.     Patient is stable, labs drawn: tsh, vitamin d, vitamin b12, and psa screen, patient tolerated with minimal discomfort. Will continue with House Call NP visits every 2 months and as needed.         Kala Castellon, APRN-CNP

## 2025-02-11 NOTE — Clinical Note
I placed an order under general supply for a chair lift for stairs. Please email or mail through [employer] to patient home. Thank you!

## 2025-02-12 LAB
25(OH)D3+25(OH)D2 SERPL-MCNC: 35 NG/ML (ref 30–100)
ALBUMIN SERPL-MCNC: 4.2 G/DL (ref 3.6–5.1)
BASOPHILS # BLD AUTO: 29 CELLS/UL (ref 0–200)
BASOPHILS NFR BLD AUTO: 0.5 %
BUN SERPL-MCNC: 23 MG/DL (ref 7–25)
BUN/CREAT SERPL: 16 (CALC) (ref 6–22)
CALCIUM SERPL-MCNC: 9.5 MG/DL (ref 8.6–10.3)
CHLORIDE SERPL-SCNC: 101 MMOL/L (ref 98–110)
CO2 SERPL-SCNC: 25 MMOL/L (ref 20–32)
CREAT SERPL-MCNC: 1.43 MG/DL (ref 0.7–1.28)
EGFRCR SERPLBLD CKD-EPI 2021: 51 ML/MIN/1.73M2
EOSINOPHIL # BLD AUTO: 143 CELLS/UL (ref 15–500)
EOSINOPHIL NFR BLD AUTO: 2.5 %
ERYTHROCYTE [DISTWIDTH] IN BLOOD BY AUTOMATED COUNT: 12.9 % (ref 11–15)
GLUCOSE SERPL-MCNC: 181 MG/DL (ref 65–99)
HCT VFR BLD AUTO: 44.9 % (ref 38.5–50)
HGB BLD-MCNC: 14.1 G/DL (ref 13.2–17.1)
LYMPHOCYTES # BLD AUTO: 1414 CELLS/UL (ref 850–3900)
LYMPHOCYTES NFR BLD AUTO: 24.8 %
MCH RBC QN AUTO: 29.1 PG (ref 27–33)
MCHC RBC AUTO-ENTMCNC: 31.4 G/DL (ref 32–36)
MCV RBC AUTO: 92.8 FL (ref 80–100)
MONOCYTES # BLD AUTO: 439 CELLS/UL (ref 200–950)
MONOCYTES NFR BLD AUTO: 7.7 %
NEUTROPHILS # BLD AUTO: 3677 CELLS/UL (ref 1500–7800)
NEUTROPHILS NFR BLD AUTO: 64.5 %
PHOSPHATE SERPL-MCNC: 2.9 MG/DL (ref 2.1–4.3)
PLATELET # BLD AUTO: 168 THOUSAND/UL (ref 140–400)
PMV BLD REES-ECKER: 11.8 FL (ref 7.5–12.5)
POTASSIUM SERPL-SCNC: 4.2 MMOL/L (ref 3.5–5.3)
RBC # BLD AUTO: 4.84 MILLION/UL (ref 4.2–5.8)
SODIUM SERPL-SCNC: 141 MMOL/L (ref 135–146)
TSH SERPL-ACNC: 2.99 MIU/L (ref 0.4–4.5)
VIT B12 SERPL-MCNC: 645 PG/ML (ref 200–1100)
WBC # BLD AUTO: 5.7 THOUSAND/UL (ref 3.8–10.8)

## 2025-02-13 ENCOUNTER — TELEPHONE (OUTPATIENT)
Dept: PRIMARY CARE | Facility: CLINIC | Age: 75
End: 2025-02-13
Payer: MEDICARE

## 2025-02-14 ENCOUNTER — TELEPHONE (OUTPATIENT)
Dept: PRIMARY CARE | Facility: CLINIC | Age: 75
End: 2025-02-14
Payer: MEDICARE

## 2025-02-14 NOTE — ASSESSMENT & PLAN NOTE
HTN/HLD/Aflutter/CHF - chronic, BP controlled. Rate controlled and euvolemic this visit. Does not follow with cardiology.  Lab Results   Component Value Date    CHOL 128 10/21/2024    CHOL 155 12/12/2023    CHOL 147 01/30/2023     Lab Results   Component Value Date    HDL 41.8 10/21/2024    HDL 40.6 12/12/2023    HDL 41 01/30/2023     Lab Results   Component Value Date    LDLCALC 42 10/21/2024    LDLCALC 48 12/12/2023    LDLCALC 53 (L) 01/30/2023     Lab Results   Component Value Date    TRIG 223 (H) 10/21/2024    TRIG 331 (H) 12/12/2023    TRIG 266 (H) 01/30/2023   May 2016 TTE:  There is normal left ventricular systolic function.   Estimated ejection fraction is 55-59%.   The left atrial size is mildly dilated.   No evidence of aortic regurgitation.   No evidence of aortic valve stenosis.   No mitral regurgitation.   A trace of tricuspid regurgitation.  -continue apixaban 5 mg BID, atorvastatin 80 mg, chlorthalidone 25 mg, fish oil 60- mg, furosemide 20 mg, losartan 100 mg, metoprolol tartrate 100 mg BID, and potassium chloride 20 mEq BID  -continue heart healthy diet  -notify provider if weight gain of more than 3 lbs in one day or 5 lbs in one week  -routinely monitor cbc, renal function, and lipids

## 2025-02-15 NOTE — TELEPHONE ENCOUNTER
Extensive telephone conversation held with patient's son, Bhupinder, regarding his concerns with medications and possible side effects. 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 It is unlikely that 20 mg of furosemide is causing the tinnitus, but advised that patient could trial stopping the lasix, observing for shortness of breath, increased leg edema, increased weight gain. If no acute HF exacerbation without lasix, could continue to see if tinnitus improves.   Advised Bhupinder that metoprolol does not cause diabetes, but may mask low blood sugar symptoms. Reviewed pathophysiology of sympathetic nervous system and pharmacology of beta blockers.   Bhupinder and the patient express concern for the many medications he is on, and would like to scale back on meds if possible. This provider will review his medications in detail and get back to the patient and his family if changes can be made safely. Bhupinder appreciated discussion and thanked me for the call.     Kala Castellon, CNP

## 2025-02-19 NOTE — TELEPHONE ENCOUNTER
Pt's son states furosemide has side effects of dry mouth, ringing in ears, and hearing loss. Simeon states pt is experiences all of these side effects.   Detail Level: Zone

## 2025-02-20 ENCOUNTER — APPOINTMENT (OUTPATIENT)
Dept: PRIMARY CARE | Facility: CLINIC | Age: 75
End: 2025-02-20
Payer: MEDICARE

## 2025-03-13 DIAGNOSIS — I50.9 HEART FAILURE, UNSPECIFIED: ICD-10-CM

## 2025-03-13 RX ORDER — POTASSIUM CHLORIDE 1500 MG/1
20 TABLET, EXTENDED RELEASE ORAL 2 TIMES DAILY
Qty: 180 TABLET | Refills: 3 | Status: SHIPPED | OUTPATIENT
Start: 2025-03-13

## 2025-03-19 ENCOUNTER — TELEPHONE (OUTPATIENT)
Dept: PRIMARY CARE | Facility: CLINIC | Age: 75
End: 2025-03-19
Payer: MEDICARE

## 2025-03-20 ENCOUNTER — OFFICE VISIT (OUTPATIENT)
Dept: PRIMARY CARE | Facility: CLINIC | Age: 75
End: 2025-03-20
Payer: MEDICARE

## 2025-03-20 VITALS
RESPIRATION RATE: 18 BRPM | HEART RATE: 64 BPM | SYSTOLIC BLOOD PRESSURE: 138 MMHG | OXYGEN SATURATION: 97 % | DIASTOLIC BLOOD PRESSURE: 70 MMHG | BODY MASS INDEX: 33.57 KG/M2 | TEMPERATURE: 97.5 F | WEIGHT: 270 LBS | HEIGHT: 75 IN

## 2025-03-20 DIAGNOSIS — I50.32 CHRONIC DIASTOLIC CONGESTIVE HEART FAILURE: ICD-10-CM

## 2025-03-20 DIAGNOSIS — E11.9 TYPE 2 DIABETES MELLITUS WITHOUT COMPLICATION, WITHOUT LONG-TERM CURRENT USE OF INSULIN (MULTI): ICD-10-CM

## 2025-03-20 DIAGNOSIS — F32.A DEPRESSIVE DISORDER: ICD-10-CM

## 2025-03-20 DIAGNOSIS — I10 ESSENTIAL HYPERTENSION: ICD-10-CM

## 2025-03-20 DIAGNOSIS — H35.30 AGE-RELATED MACULAR DEGENERATION: ICD-10-CM

## 2025-03-20 DIAGNOSIS — I48.3 TYPICAL ATRIAL FLUTTER (MULTI): Primary | ICD-10-CM

## 2025-03-20 DIAGNOSIS — R29.6 RECURRENT FALLS: ICD-10-CM

## 2025-03-20 DIAGNOSIS — B02.29 POSTHERPETIC NEURALGIA: ICD-10-CM

## 2025-03-20 PROCEDURE — 3078F DIAST BP <80 MM HG: CPT | Performed by: NURSE PRACTITIONER

## 2025-03-20 PROCEDURE — 1125F AMNT PAIN NOTED PAIN PRSNT: CPT | Performed by: NURSE PRACTITIONER

## 2025-03-20 PROCEDURE — 1159F MED LIST DOCD IN RCRD: CPT | Performed by: NURSE PRACTITIONER

## 2025-03-20 PROCEDURE — 3075F SYST BP GE 130 - 139MM HG: CPT | Performed by: NURSE PRACTITIONER

## 2025-03-20 PROCEDURE — 1160F RVW MEDS BY RX/DR IN RCRD: CPT | Performed by: NURSE PRACTITIONER

## 2025-03-20 PROCEDURE — 99349 HOME/RES VST EST MOD MDM 40: CPT | Performed by: NURSE PRACTITIONER

## 2025-03-20 ASSESSMENT — ENCOUNTER SYMPTOMS
DEPRESSION: 1
OCCASIONAL FEELINGS OF UNSTEADINESS: 1
LOSS OF SENSATION IN FEET: 0

## 2025-03-20 ASSESSMENT — PAIN SCALES - GENERAL: PAINLEVEL_OUTOF10: 10-WORST PAIN EVER

## 2025-03-20 NOTE — PROGRESS NOTES
"Subjective   Patient ID: Bhupinder Cortez is a 74 y.o. male who presents for Follow-up (Routine follow up, chronic medical conditions).    Visit for 75 y/o male seen today in private home, alone for routine follow up of chronic medical conditions. PMHx HTN, Hyperlipidemia, CAD, CHF, Atrial Flutter, GERD, DM, postherpatic neuralgia, anemia, chronic pain, iron deficiency anemia, macular degeneration, depression.     Pt is sitting at dining room table this morning. He is alert, oriented, answering all questions without difficulty. Pt lives with his son Bhupinder and daughter in law Nathalia. He does not drive or have a vehicle and reports it to be difficult to get out for medical appointments. Pt does go out to the bank with his son once monthly. Otherwise, he is home bound. Pt is able to manage his own medications. He admits that he was forgetting to take his medications on occasion so he started using a pill box and this has helped with his compliance. Pt is able to perform his ADLs but does require assistance with meal preparation. He does use the microwave but rarely uses the stove. Pt denies appetite changes, signs of weight loss, abdominal pain, nausea, vomiting. He endorses constipation alternating with diarrhea. Denies urinary concerns. Pt is ambulatory without assistive device. He was using a walker at one time but has stopped. He admits that he tripped going up the stairs last Sunday. He \"bumped his shin\" but it didn't leave any marks. Pt has chronic back pain and does endorse neuropathic pain 2/2 shingles. He is using a lidocaine patch on left torso with improvement.     HTN/A-flutter/CHF- patient is no longer following with cardiology. He was previously seeing Dr. Patino. He reports seeing Dr. Bates at one time for Atrial Flutter. Pt reports it to be difficult to get out for medical appointments and this is the main reason he stopped seeing his specialists. He does not monitor his BP or weight. Pt " endorses occasional palpitations with intermittent dizziness/lightheadedness with position changes. He denies headaches, chest pain. Continues on Eliquis. Denies any increased bruising or bleeding concerns.     DM-  patient has tried many diabetic medications in the past but was unable to tolerate them. He does take Pioglitazone and tolerates med well without issue. He does not monitor his glucose levels. Continues to work on healthy diet habits, using sugar substitutes and lower carb options. Last A1c 7.5.     Depression/Insomnia- mood is stable. Pt does admit that he has been upset since the weekend as his cat . He is tearful discussing this. Continues on Bupropion and Duloxetine. Denies SI.      Home Visit:          Medically necessary due to: patient has limited support systems to help the patient attend office visits, Illness or condition that results in activity lmitation or restriction that impacts the ability to leave home such as:, unsteady gait/poor balance       Current Outpatient Medications:     acetaminophen (Tylenol) 500 mg tablet, Take 2 tablets (1,000 mg) by mouth once daily., Disp: , Rfl:     atorvastatin (Lipitor) 80 mg tablet, Take 1 tablet (80 mg) by mouth once daily., Disp: 90 tablet, Rfl: 3    azelastine (Astelin) 137 mcg (0.1 %) nasal spray, Administer 1 spray into each nostril 2 times a day. Use in each nostril as directed, Disp: 30 mL, Rfl: 2    buPROPion XL (Wellbutrin XL) 150 mg 24 hr tablet, TAKE 1 TABLET BY MOUTH EVERY DAY IN THE MORNING FOR 90 DAYS, Disp: 90 tablet, Rfl: 0    chlorthalidone (Hygroton) 25 mg tablet, TAKE 1 TABLET (25 MG) BY MOUTH ONCE DAILY IN THE MORNING., Disp: 90 tablet, Rfl: 3    DULoxetine (Cymbalta) 30 mg DR capsule, TAKE 1 CAPSULE (30 MG) BY MOUTH ONCE DAILY. TAKE 1 CAPSULE BY MOUTH EVERY DAY WITH 60 MG CAPSULE, Disp: 90 capsule, Rfl: 3    DULoxetine (Cymbalta) 60 mg DR capsule, TAKE 1 CAPSULE (60 MG) BY MOUTH ONCE DAILY. TAKE 1 CAPSULE BY MOUTH EVERY DAY  WITH 30MG CAPSULE, Disp: 90 capsule, Rfl: 3    Eliquis 5 mg tablet, TAKE ONE (1) TABLET BY MOUTH TWICE DAILY WITH OR WITHOUT FOOD., Disp: 60 tablet, Rfl: 11    furosemide (Lasix) 20 mg tablet, TAKE 1 TABLET BY MOUTH EVERY DAY, Disp: 90 tablet, Rfl: 3    Klor-Con M20 20 mEq ER tablet, TAKE 1 TABLET (20 MEQ) BY MOUTH 2 TIMES A DAY., Disp: 180 tablet, Rfl: 3    lidocaine (Lidoderm) 5 % patch, Place 2 patches over 12 hours on the skin once daily. Apply to painful area 12 hours per day, remove for 12 hours., Disp: 60 patch, Rfl: 3    losartan (Cozaar) 100 mg tablet, TAKE 1 TABLET BY MOUTH EVERY DAY, Disp: 90 tablet, Rfl: 3    metoprolol tartrate (Lopressor) 100 mg tablet, TAKE 1 TABLET BY MOUTH TWICE A DAY, Disp: 180 tablet, Rfl: 3    multivitamin with minerals iron-free (multivit-iron-minerals-folic acid), Take 1 tablet by mouth once daily., Disp: , Rfl:     nystatin (Mycostatin) 100,000 unit/gram powder, Apply 1 Application topically 2 times a day. Externally to left axilla as needed, Disp: , Rfl:     nystatin (Mycostatin) cream, Apply 1 Application topically 2 times a day as needed., Disp: , Rfl:     pantoprazole (ProtoNix) 20 mg EC tablet, TAKE 1 TABLET BY MOUTH EVERY DAY, Disp: 90 tablet, Rfl: 3    pioglitazone (Actos) 15 mg tablet, TAKE 1 TABLET BY MOUTH EVERY DAY, Disp: 90 tablet, Rfl: 3     Review of Systems  Constitutional: Positive for fatigue. Negative for appetite change, chills, fever, unexplained weight loss.   HENT: Negative for trouble swallowing, sore throat.   Eyes: Positive for visual disturbance, history of macular degeneration.   Respiratory: Positive for shortness of breath on exertion. Negative for cough, wheezing.    Cardiovascular: Positive for occasional palpitations, sometimes at bed when trying to relax. Negative for chest pain, leg swelling.   Gastrointestinal: Positive for constipation alternating with diarrhea. Negative for abdominal pain, nausea and vomiting.   Endocrine: Positive for  "diabetes  Genitourinary: Negative for difficulty urinating.   Musculoskeletal: Positive for arthralgias, chronic knee pain, back pain, unsteady gait, difficulty with balance, history of falls   Neurological: Positive for light-headedness.   Hematological:  Does not bruise/bleed easily.   Psychiatric/Behavioral: Positive for depression, difficulty sleeping     Objective   /70 (BP Location: Left arm, Patient Position: Sitting, BP Cuff Size: Adult)   Pulse 64   Temp 36.4 °C (97.5 °F) (Temporal)   Resp 18   Ht 1.905 m (6' 3\")   Wt 122 kg (270 lb)   SpO2 97%   BMI 33.75 kg/m²     Physical Exam  Constitutional:       General: He is not in acute distress.     Appearance: Normal appearance. He is obese. He is not toxic-appearing.      Comments: Alert, well-groomed, well-nourished   HENT:      Head: Normocephalic.      Nose: No congestion or rhinorrhea.      Mouth/Throat:      Mouth: Mucous membranes are moist.   Eyes:      General: No scleral icterus.     Extraocular Movements: Extraocular movements intact.      Conjunctiva/sclera: Conjunctivae normal.      Pupils: Pupils are equal, round, and reactive to light.   Cardiovascular:      Rate and Rhythm: Irregular rhythm. Fluctuating heart rate.      Pulses: Normal pulses.      Heart sounds: No murmur heard.  Pulmonary:      Effort: Pulmonary effort is normal. No respiratory distress.      Breath sounds: No wheezing or rales.   Abdominal:      General: Bowel sounds are normal. There is no distension.      Palpations: Abdomen is soft.      Tenderness: There is no abdominal tenderness. There is no guarding.   Musculoskeletal:         General: Normal range of motion.      Cervical back: Neck supple.      Right lower leg: No edema.      Left lower leg: No edema.   Skin:     General: Skin is warm and dry.      Capillary Refill: Capillary refill takes less than 2 seconds.      Coloration: Skin is not jaundiced.      Findings: No bruising, erythema or " rash.  Neurological:      General: No focal deficit present.      Mental Status: He is alert and oriented to person, place, and time.   Clear and coherent speech, MAEx4 with equal strength, 5/5.  Psychiatric:         Mood and Affect: Mood normal.         Behavior: Behavior normal.         Thought Content: Thought content normal.         Judgment: Judgment normal.     Lab Results   Component Value Date    WBC 5.7 02/11/2025    HGB 14.1 02/11/2025    HCT 44.9 02/11/2025    MCV 92.8 02/11/2025     02/11/2025     Lab Results   Component Value Date    CREATININE 1.43 (H) 02/11/2025    BUN 23 02/11/2025     02/11/2025    K 4.2 02/11/2025     02/11/2025    CO2 25 02/11/2025     Lab Results   Component Value Date    TSH 2.99 02/11/2025     Lab Results   Component Value Date    HGBA1C 7.5 (H) 10/21/2024      Vitamin B12- 645  Vitamin D- 35    Assessment/Plan   Diagnoses and all orders for this visit:  Typical atrial flutter (Multi)  -     Referral to Cardiac Electrophysiology; Future  -chronic, heart rhythm irregular today, rate fluctuating between 60 and 120, pt had not taken morning medications yet  -recommend re-establishing care as soon as possible with Dr. Bates  -continue Metoprolol, Eliquis   -monitor for increased s/sx of bruising or bleeding     Essential hypertension  -chronic, BP stable  -continue chlorthalidone, losartan     Chronic diastolic congestive heart failure  -chronic, stable, euvolemic on exam  -continue furosemide, potassium     Type 2 diabetes mellitus without complication, without long-term current use of insulin (Multi)  -chronic, managed with pioglitazone   -discussed healthy diet habits, increased physical activity     Postherpetic neuralgia  -chronic, managed with Lidocaine patch    Age-related macular degeneration  -chronic, recommend ophthalmology follow up    Recurrent falls  -acute, intermittent, tripped on stairs over the weekend, no injury  -declines to work with home  health, PT/OT services     Depressive disorder  -chronic, mood stable, does admit to increased sadness, cat  over the weekend  -continue bupropion, duloxetine     Patient delicately stable. Recommend re-establishing with Dr. Bates for history of Atrial Flutter as soon as possible. Will follow up with pt in 4 weeks with plans to update routine labs. Advised pt to contact house calls office with any acute concerns or medication needs.        Keyla Nuñez, APRN-CNP.

## 2025-04-14 ENCOUNTER — TELEPHONE (OUTPATIENT)
Dept: PRIMARY CARE | Facility: CLINIC | Age: 75
End: 2025-04-14
Payer: MEDICARE

## 2025-04-14 NOTE — TELEPHONE ENCOUNTER
Patient son calling to request provider review the patient's meds as he would like to cut down the number of pills patient is taking daily.  Bhupinder Chaney feels upwards of 20 pills daily is too many.  Please review and perhaps cut them back at the April 22 appointment.  Thank you.

## 2025-04-21 ENCOUNTER — TELEPHONE (OUTPATIENT)
Dept: PRIMARY CARE | Facility: CLINIC | Age: 75
End: 2025-04-21
Payer: MEDICARE

## 2025-04-22 ENCOUNTER — OFFICE VISIT (OUTPATIENT)
Dept: PRIMARY CARE | Facility: CLINIC | Age: 75
End: 2025-04-22
Payer: MEDICARE

## 2025-04-22 VITALS
TEMPERATURE: 97.3 F | WEIGHT: 277.34 LBS | OXYGEN SATURATION: 99 % | BODY MASS INDEX: 34.48 KG/M2 | HEIGHT: 75 IN | DIASTOLIC BLOOD PRESSURE: 78 MMHG | RESPIRATION RATE: 18 BRPM | SYSTOLIC BLOOD PRESSURE: 138 MMHG | HEART RATE: 86 BPM

## 2025-04-22 DIAGNOSIS — I48.3 TYPICAL ATRIAL FLUTTER (MULTI): ICD-10-CM

## 2025-04-22 DIAGNOSIS — I10 ESSENTIAL HYPERTENSION: ICD-10-CM

## 2025-04-22 DIAGNOSIS — E11.9 TYPE 2 DIABETES MELLITUS WITHOUT COMPLICATION, WITHOUT LONG-TERM CURRENT USE OF INSULIN: Primary | ICD-10-CM

## 2025-04-22 DIAGNOSIS — K21.9 GASTRO-ESOPHAGEAL REFLUX DISEASE WITHOUT ESOPHAGITIS: ICD-10-CM

## 2025-04-22 DIAGNOSIS — R29.6 RECURRENT FALLS: ICD-10-CM

## 2025-04-22 DIAGNOSIS — N18.31 STAGE 3A CHRONIC KIDNEY DISEASE (MULTI): ICD-10-CM

## 2025-04-22 DIAGNOSIS — F32.A DEPRESSIVE DISORDER: ICD-10-CM

## 2025-04-22 PROCEDURE — 1160F RVW MEDS BY RX/DR IN RCRD: CPT | Performed by: NURSE PRACTITIONER

## 2025-04-22 PROCEDURE — 99350 HOME/RES VST EST HIGH MDM 60: CPT | Performed by: NURSE PRACTITIONER

## 2025-04-22 PROCEDURE — 3075F SYST BP GE 130 - 139MM HG: CPT | Performed by: NURSE PRACTITIONER

## 2025-04-22 PROCEDURE — 1125F AMNT PAIN NOTED PAIN PRSNT: CPT | Performed by: NURSE PRACTITIONER

## 2025-04-22 PROCEDURE — 3078F DIAST BP <80 MM HG: CPT | Performed by: NURSE PRACTITIONER

## 2025-04-22 PROCEDURE — 1159F MED LIST DOCD IN RCRD: CPT | Performed by: NURSE PRACTITIONER

## 2025-04-22 ASSESSMENT — ENCOUNTER SYMPTOMS
VOMITING: 0
DIZZINESS: 1
NAUSEA: 0
SLEEP DISTURBANCE: 0
WHEEZING: 0
BACK PAIN: 1
DYSPHORIC MOOD: 1
APPETITE CHANGE: 0
ABDOMINAL PAIN: 0
SORE THROAT: 0
DIARRHEA: 1
SHORTNESS OF BREATH: 1
SPEECH DIFFICULTY: 0
CHILLS: 0
DIFFICULTY URINATING: 0
TROUBLE SWALLOWING: 0
FATIGUE: 0
BRUISES/BLEEDS EASILY: 0
FEVER: 0
PALPITATIONS: 1
COUGH: 0
HEMATURIA: 0
WOUND: 0
ARTHRALGIAS: 1
CONSTIPATION: 1
CHEST TIGHTNESS: 0
NERVOUS/ANXIOUS: 0

## 2025-04-22 ASSESSMENT — PAIN SCALES - GENERAL: PAINLEVEL_OUTOF10: 9

## 2025-04-22 NOTE — PROGRESS NOTES
Subjective   Patient ID: Bhupinder Cortez is a 74 y.o. male who presents for Follow-up (4 week follow up, Atrial Flutter, chronic medical conditions, labs).    Visit for 73 y/o male seen today in private home, alone for routine follow up of multiple medical issues. PMHx HTN, Hyperlipidemia, CAD, CHF, Atrial Flutter, GERD, DM, postherpatic neuralgia, anemia, chronic pain, iron deficiency anemia, macular degeneration, depression.      Pt is sitting at dining room table. He is alert, oriented, answering all questions without difficulty. Pt lives with his son Bhupinder and daughter in law Nathalia. He does not drive anymore and only leaves the house about once per month to go to the bank with his son. Pt reports it to be difficult to get out for medical appointments. He has unsteady gait and has history of frequent falls, most recently 1 week ago. Pt was putting some items in the cupboard in his room when he became dizzy and fell. He was able to get himself up. Initially had some left shoulder pain but that has improved. Pt denies head injury or LOC. He did not go to the emergency department for evaluation. Pt is able to manage his own medications. He uses a pill minder and that has helped with compliance. Patients son contacted house calls office requesting a medication review as he would like patient to cut down on his medication load. Pt is able to perform his ADLs but does require assistance with meal preparation. He uses the microwave but rarely uses the stove. Pt reports that he has had an increased appetite over the holidays and has had some weight gain. Pt had some gas pains over the weekend but this has since resolved. He denies abdominal pain, nausea, vomiting. Pt endorses constipation alternating with diarrhea. He endorses nocturia but declines medication for this.      HTN/A-flutter/CHF- patient continues to endorse occasional palpitations with intermittent dizziness, lightheadedness, frequent falls. He  "denies headaches, chest pain. Has Atrial Flutter, managed with Eliquis. A referral was sent to Dr. Bates last month but pt reports that he has not scheduled the appointment because he does not have transportation.   Pt does not monitor his BP or weight. He denies any increased bruising or bleeding concerns.     DM- diabetes uncontrolled. Last A1c 7.5. Pt has tried many diabetic medications in the past but was unable to tolerate them. Pt takes Pioglitazone and tolerates med well without side effects. He does not monitor his glucose levels and is not interested in learning how.      GERD- patient continues on Pantoprazole. He reports that he has had a lot of issues with indigestion in the past and that all stopped once he started taking Pantoprazole. He is no longer using Tums.      Depression/Insomnia- patient continues on Bupropion and Duloxetine. He feels his mood is stable. Reports getting \"cranky\" when watching youtube but otherwise feels ok. He denies any recent mood swings or periods of agitation.      Home Visit:          Medically necessary due to: patient has limited support systems to help the patient attend office visits, Illness or condition that results in activity lmitation or restriction that impacts the ability to leave home such as:, unsteady gait/poor balance         Current Outpatient Medications:     acetaminophen (Tylenol) 500 mg tablet, Take 2 tablets (1,000 mg) by mouth once daily., Disp: , Rfl:     atorvastatin (Lipitor) 80 mg tablet, Take 1 tablet (80 mg) by mouth once daily., Disp: 90 tablet, Rfl: 3    azelastine (Astelin) 137 mcg (0.1 %) nasal spray, Administer 1 spray into each nostril 2 times a day. Use in each nostril as directed, Disp: 30 mL, Rfl: 2    buPROPion XL (Wellbutrin XL) 150 mg 24 hr tablet, TAKE 1 TABLET BY MOUTH EVERY DAY IN THE MORNING FOR 90 DAYS, Disp: 90 tablet, Rfl: 0    chlorthalidone (Hygroton) 25 mg tablet, TAKE 1 TABLET (25 MG) BY MOUTH ONCE DAILY IN THE " MORNING., Disp: 90 tablet, Rfl: 3    DULoxetine (Cymbalta) 30 mg DR capsule, TAKE 1 CAPSULE (30 MG) BY MOUTH ONCE DAILY. TAKE 1 CAPSULE BY MOUTH EVERY DAY WITH 60 MG CAPSULE, Disp: 90 capsule, Rfl: 3    DULoxetine (Cymbalta) 60 mg DR capsule, TAKE 1 CAPSULE (60 MG) BY MOUTH ONCE DAILY. TAKE 1 CAPSULE BY MOUTH EVERY DAY WITH 30MG CAPSULE, Disp: 90 capsule, Rfl: 3    Eliquis 5 mg tablet, TAKE ONE (1) TABLET BY MOUTH TWICE DAILY WITH OR WITHOUT FOOD., Disp: 60 tablet, Rfl: 11    furosemide (Lasix) 20 mg tablet, TAKE 1 TABLET BY MOUTH EVERY DAY, Disp: 90 tablet, Rfl: 3    Klor-Con M20 20 mEq ER tablet, TAKE 1 TABLET (20 MEQ) BY MOUTH 2 TIMES A DAY., Disp: 180 tablet, Rfl: 3    lidocaine (Lidoderm) 5 % patch, Place 2 patches over 12 hours on the skin once daily. Apply to painful area 12 hours per day, remove for 12 hours., Disp: 60 patch, Rfl: 3    losartan (Cozaar) 100 mg tablet, TAKE 1 TABLET BY MOUTH EVERY DAY, Disp: 90 tablet, Rfl: 3    metoprolol tartrate (Lopressor) 100 mg tablet, TAKE 1 TABLET BY MOUTH TWICE A DAY, Disp: 180 tablet, Rfl: 3    multivitamin with minerals iron-free (multivit-iron-minerals-folic acid), Take 1 tablet by mouth once daily., Disp: , Rfl:     nystatin (Mycostatin) 100,000 unit/gram powder, Apply 1 Application topically 2 times a day. Externally to left axilla as needed, Disp: , Rfl:     nystatin (Mycostatin) cream, Apply 1 Application topically 2 times a day as needed., Disp: , Rfl:     pantoprazole (ProtoNix) 20 mg EC tablet, TAKE 1 TABLET BY MOUTH EVERY DAY, Disp: 90 tablet, Rfl: 3    pioglitazone (Actos) 15 mg tablet, TAKE 1 TABLET BY MOUTH EVERY DAY, Disp: 90 tablet, Rfl: 3     Review of Systems   Constitutional:  Negative for appetite change, chills, fatigue and fever.        Positive for weight gain   HENT:  Positive for hearing loss. Negative for congestion, sore throat and trouble swallowing.    Eyes:  Positive for visual disturbance (has macular degeneration, reports blind spots,  "overdue to see opthalmology).   Respiratory:  Positive for shortness of breath (with exertion). Negative for cough, chest tightness and wheezing.    Cardiovascular:  Positive for palpitations (feels fluttering at night, comes and goes, doesnt happen every night). Negative for chest pain and leg swelling.   Gastrointestinal:  Positive for constipation and diarrhea. Negative for abdominal pain, nausea and vomiting.        Reports constipation alternating with diarrhea    Genitourinary:  Negative for difficulty urinating and hematuria.        Positive for nocturia    Musculoskeletal:  Positive for arthralgias, back pain and gait problem.        History of frequent falls, last fall was 1 week ago   Skin:  Negative for rash and wound.   Neurological:  Positive for dizziness (felt dizzy last weekend and fell). Negative for speech difficulty.   Hematological:  Does not bruise/bleed easily.   Psychiatric/Behavioral:  Positive for dysphoric mood. Negative for sleep disturbance. The patient is not nervous/anxious.      Objective   /78 (BP Location: Left arm, Patient Position: Sitting, BP Cuff Size: Adult)   Pulse 86   Temp 36.3 °C (97.3 °F) (Temporal)   Resp 18   Ht 1.905 m (6' 3\")   Wt 126 kg (277 lb 5.4 oz)   SpO2 99%   BMI 34.66 kg/m²     Physical Exam  Constitutional:       General: He is not in acute distress.     Appearance: Normal appearance. He is obese. He is not toxic-appearing.      Comments: Alert, well-groomed, well-nourished   HENT:      Head: Normocephalic.      Nose: No congestion or rhinorrhea.      Mouth/Throat:      Mouth: Mucous membranes are moist.   Eyes:      General: No scleral icterus.     Extraocular Movements: Extraocular movements intact.      Conjunctiva/sclera: Conjunctivae normal.      Pupils: Pupils are equal, round, and reactive to light.   Cardiovascular:      Rate and Rhythm: Irregular rhythm. Fluctuating heart rate from 70-98      Pulses: Normal pulses.      Heart sounds: No " murmur heard.  Pulmonary:      Effort: Pulmonary effort is normal. No respiratory distress.      Breath sounds: No wheezing or rales.   Abdominal:      General: Bowel sounds are normal. There is no distension.      Palpations: Abdomen is soft.      Tenderness: There is no abdominal tenderness. There is no guarding.   Musculoskeletal:         General: Normal range of motion.      Cervical back: Neck supple.      Right lower leg: No edema.      Left lower leg: No edema.      Normal range of motion of left shoulder and arm.   Skin:     General: Skin is warm and dry.      Capillary Refill: Capillary refill takes less than 2 seconds.      Coloration: Skin is not jaundiced.      Findings: No bruising, erythema or rash  Neurological:      General: No focal deficit present.      Mental Status: He is alert and oriented to person, place, and time.   Clear and coherent speech, MAEx4 with equal strength, 5/5.  Psychiatric:         Mood and Affect: Mood normal.         Behavior: Behavior normal.         Thought Content: Thought content normal.         Judgment: Judgment normal.     Assessment/Plan   Diagnoses and all orders for this visit:  Type 2 diabetes mellitus without complication, without long-term current use of insulin  -     Hemoglobin A1c; Future  -chronic, managed with Pioglitazone   -unable to tolerate most diabetic medications  -encouraged healthy diet habits, increased physical activity     Gastro-esophageal reflux disease without esophagitis  -chronic, well managed with Pantoprazole  -discussed stopping medication but patient declines as he worries about recurrent issues with indigestion    Stage 3a chronic kidney disease (Multi)  -chronic, last GFR 51  -will check renal function today     Typical atrial flutter (Multi)  -chronic, managed with Eliquis, Metoprolol  -referral sent to Dr. Bates last visit- pt has not scheduled appointment   -discussed following up with Dr. Fabian for Aflutter, especially due to  fluctuating heart rates, intermittent dizziness     Essential hypertension  -     Basic metabolic panel; Future  -     CBC and Auto Differential; Future  -chronic, BP stable  -continue chlorthalidone, losartan     Recurrent falls  -acute fall 1 week ago, left shoulder pain improved, moving extremity without issue   -continue safety measures and supportive care     Depressive disorder  -chronic, mood stable  -continue Bupropion 150 mg daily  -pt currently takes Cymbalta 90 mg. Will continue the 60 mg capsule and stop the 30 mg capsule. Pt is aware and in agreement. Will notify provider if mood/depression worsen.     Unable to obtain labs in home today. Pt to go to outpatient lab to have CBC, BMP, A1c done. Advised pt to contact house calls office with any acute concerns or medication needs.     Greater than 60 minutes spent face to face with more than 50% spent on counseling., reviewing medical records and coordination of care        Keyla Nuñez, DIANA-CNP

## 2025-06-04 DIAGNOSIS — J31.0 CHRONIC RHINITIS: ICD-10-CM

## 2025-06-04 RX ORDER — AZELASTINE 1 MG/ML
1 SPRAY, METERED NASAL 2 TIMES DAILY
Qty: 30 ML | Refills: 2 | Status: SHIPPED | OUTPATIENT
Start: 2025-06-04 | End: 2026-06-04

## 2025-06-12 DIAGNOSIS — F32.9 MAJOR DEPRESSIVE DISORDER, SINGLE EPISODE, UNSPECIFIED: ICD-10-CM

## 2025-06-12 RX ORDER — BUPROPION HYDROCHLORIDE 150 MG/1
150 TABLET ORAL
Qty: 90 TABLET | Refills: 3 | Status: SHIPPED | OUTPATIENT
Start: 2025-06-12 | End: 2025-09-10

## 2025-06-20 ENCOUNTER — TELEPHONE (OUTPATIENT)
Dept: PRIMARY CARE | Facility: CLINIC | Age: 75
End: 2025-06-20
Payer: MEDICARE

## 2025-06-23 ENCOUNTER — OFFICE VISIT (OUTPATIENT)
Dept: PRIMARY CARE | Facility: CLINIC | Age: 75
End: 2025-06-23
Payer: MEDICARE

## 2025-06-23 VITALS
HEIGHT: 75 IN | BODY MASS INDEX: 34.19 KG/M2 | DIASTOLIC BLOOD PRESSURE: 84 MMHG | HEART RATE: 77 BPM | RESPIRATION RATE: 18 BRPM | SYSTOLIC BLOOD PRESSURE: 138 MMHG | WEIGHT: 275 LBS | TEMPERATURE: 97.2 F | OXYGEN SATURATION: 100 %

## 2025-06-23 DIAGNOSIS — I10 ESSENTIAL HYPERTENSION: Primary | ICD-10-CM

## 2025-06-23 DIAGNOSIS — I48.92 ATRIAL FLUTTER, UNSPECIFIED TYPE (MULTI): ICD-10-CM

## 2025-06-23 DIAGNOSIS — F32.A DEPRESSIVE DISORDER: ICD-10-CM

## 2025-06-23 DIAGNOSIS — E11.9 TYPE 2 DIABETES MELLITUS WITHOUT COMPLICATION, WITHOUT LONG-TERM CURRENT USE OF INSULIN: ICD-10-CM

## 2025-06-23 DIAGNOSIS — K21.9 GASTRO-ESOPHAGEAL REFLUX DISEASE WITHOUT ESOPHAGITIS: ICD-10-CM

## 2025-06-23 DIAGNOSIS — I50.32 CHRONIC DIASTOLIC CONGESTIVE HEART FAILURE: ICD-10-CM

## 2025-06-23 PROCEDURE — 99349 HOME/RES VST EST MOD MDM 40: CPT | Performed by: NURSE PRACTITIONER

## 2025-06-23 PROCEDURE — 1036F TOBACCO NON-USER: CPT | Performed by: NURSE PRACTITIONER

## 2025-06-23 PROCEDURE — 36415 COLL VENOUS BLD VENIPUNCTURE: CPT | Performed by: NURSE PRACTITIONER

## 2025-06-23 PROCEDURE — 3075F SYST BP GE 130 - 139MM HG: CPT | Performed by: NURSE PRACTITIONER

## 2025-06-23 PROCEDURE — 1159F MED LIST DOCD IN RCRD: CPT | Performed by: NURSE PRACTITIONER

## 2025-06-23 PROCEDURE — 1160F RVW MEDS BY RX/DR IN RCRD: CPT | Performed by: NURSE PRACTITIONER

## 2025-06-23 PROCEDURE — 1125F AMNT PAIN NOTED PAIN PRSNT: CPT | Performed by: NURSE PRACTITIONER

## 2025-06-23 PROCEDURE — 3079F DIAST BP 80-89 MM HG: CPT | Performed by: NURSE PRACTITIONER

## 2025-06-23 RX ORDER — DULOXETIN HYDROCHLORIDE 60 MG/1
60 CAPSULE, DELAYED RELEASE ORAL DAILY
Start: 2025-06-23

## 2025-06-23 ASSESSMENT — PAIN SCALES - GENERAL: PAINLEVEL_OUTOF10: 8

## 2025-06-23 NOTE — PROGRESS NOTES
Subjective   Patient ID: Bhupinder Cortez is a 74 y.o. male who presents for Follow-up (Routine 2 month follow up, Afib, dizziness, depression, labs).    Visit for 73 y/o male seen today in private home, alone for 2 month routine follow up of chronic medical conditions.     PMHx HTN, Hyperlipidemia, CAD, CHF, Atrial Flutter, GERD, DM, postherpatic neuralgia, anemia, chronic pain, iron deficiency anemia, macular degeneration, depression.      Pt answered the door upon provider arrival. He was able to ambulate without assistive device into the dining room and is sitting at the table for exam. Pt is alert, oriented, answering all questions without difficulty. He lives with his son Bhupinder and daughter in law Nathalia but reports that his family went camping for the weekend so he has been home alone for a few days. Pt does not drive anymore and only leaves the house about once per month to go to the bank with his son. He does not follow with any specialists. Pt was referred to establish care with electrophysiology due to Atrial Flutter/Fib but did not schedule an appointment. He was also supposed to go and get labs done before today's follow up but reports that he forgot. Pt tells me that he has been doing very well since his last follow up in April. He has not had any further dizziness or palpitations. His shortness of breath has improved. Pt denies any recent falls. Pt continues to manage his own medications. He uses a pill minder daily and reports that this has helped with his compliance. Pt is able to perform his ADLs but does require assistance with meal preparation. Pt will make quick foods while his son and DIL are gone camping but admits that he mostly uses the microwave.     HTN/A-flutter/CHF- patient has Atrial Flutter, managed with Eliquis. He denies any further palpitations or dizziness. He has not had any more falls. Denies headaches, chest pain. Pt does not monitor his BP or weight. He denies any  "increased bruising or bleeding concerns.     DM- patients son recently bought him a glucometer but patient has not used it. He tells me that he does not want to check his sugar level, does not know how to use the machine and does not want to learn how. Last A1c was 7.5. Pt has tried many diabetic medications in the past but was unable to tolerate them. He takes Pioglitazone and tolerates med well without side effects. Pt rports that he has not had any pastries since his last follow up and he is trying to reduce his carb intake.      GERD- patient continues on Pantoprazole. He reports that he has had a lot of issues with indigestion in the past but he has not had any issues since starting Pantoprazole.      Depression/Insomnia- patient continues on Bupropion and Duloxetine. His Duloxetine dose was reduced from 90mg to 60mg daily during last follow up. Pt reports feeling that his mood is stable. He states \"I actually feel better than before\". Pt denies any recent mood swings or periods of agitation.      Home Visit:          Medically necessary due to: patient has limited support systems to help the patient attend office visits, Illness or condition that results in activity lmitation or restriction that impacts the ability to leave home such as:, unsteady gait/poor balance         Current Outpatient Medications:     azelastine (Astelin) 137 mcg (0.1 %) nasal spray, ADMINISTER 1 SPRAY INTO EACH NOSTRIL 2 TIMES A DAY. USE IN EACH NOSTRIL AS DIRECTED, Disp: 30 mL, Rfl: 2    acetaminophen (Tylenol) 500 mg tablet, Take 2 tablets (1,000 mg) by mouth once daily., Disp: , Rfl:     atorvastatin (Lipitor) 80 mg tablet, Take 1 tablet (80 mg) by mouth once daily., Disp: 90 tablet, Rfl: 3    buPROPion XL (Wellbutrin XL) 150 mg 24 hr tablet, TAKE 1 TABLET BY MOUTH EVERY DAY IN THE MORNING FOR 90 DAYS, Disp: 90 tablet, Rfl: 3    chlorthalidone (Hygroton) 25 mg tablet, TAKE 1 TABLET (25 MG) BY MOUTH ONCE DAILY IN THE MORNING., Disp: " "90 tablet, Rfl: 3    DULoxetine (Cymbalta) 60 mg DR capsule, TAKE 1 CAPSULE (60 MG) BY MOUTH ONCE DAILY. TAKE 1 CAPSULE BY MOUTH EVERY DAY WITH 30MG CAPSULE (Patient taking differently: Take 1 capsule (60 mg) by mouth once daily.), Disp: 90 capsule, Rfl: 3    Eliquis 5 mg tablet, TAKE ONE (1) TABLET BY MOUTH TWICE DAILY WITH OR WITHOUT FOOD., Disp: 60 tablet, Rfl: 11    furosemide (Lasix) 20 mg tablet, TAKE 1 TABLET BY MOUTH EVERY DAY, Disp: 90 tablet, Rfl: 3    Klor-Con M20 20 mEq ER tablet, TAKE 1 TABLET (20 MEQ) BY MOUTH 2 TIMES A DAY., Disp: 180 tablet, Rfl: 3    lidocaine (Lidoderm) 5 % patch, Place 2 patches over 12 hours on the skin once daily. Apply to painful area 12 hours per day, remove for 12 hours., Disp: 60 patch, Rfl: 3    losartan (Cozaar) 100 mg tablet, TAKE 1 TABLET BY MOUTH EVERY DAY, Disp: 90 tablet, Rfl: 3    metoprolol tartrate (Lopressor) 100 mg tablet, TAKE 1 TABLET BY MOUTH TWICE A DAY, Disp: 180 tablet, Rfl: 3    multivitamin with minerals iron-free (multivit-iron-minerals-folic acid), Take 1 tablet by mouth once daily., Disp: , Rfl:     nystatin (Mycostatin) 100,000 unit/gram powder, Apply 1 Application topically 2 times a day. Externally to left axilla as needed, Disp: , Rfl:     nystatin (Mycostatin) cream, Apply 1 Application topically 2 times a day as needed., Disp: , Rfl:     pantoprazole (ProtoNix) 20 mg EC tablet, TAKE 1 TABLET BY MOUTH EVERY DAY, Disp: 90 tablet, Rfl: 3    pioglitazone (Actos) 15 mg tablet, TAKE 1 TABLET BY MOUTH EVERY DAY, Disp: 90 tablet, Rfl: 3     Review of Systems  Constitutional:  Negative for appetite change, chills, fatigue and fever.   HENT:  Positive for hearing loss. Negative for congestion, sore throat and trouble swallowing.    Eyes:  Positive for visual disturbance (has macular degeneration, reports blind spots, overdue to see opthalmology).   Respiratory:  Positive for shortness of breath (with exertion, \"a lot of walking\") Negative for cough, chest " "tightness and wheezing.    Cardiovascular: Negative for chest pain, palpitations and leg swelling.   Gastrointestinal:  Positive for constipation and diarrhea. Negative for abdominal pain, nausea and vomiting.        Reports constipation alternating with diarrhea    Genitourinary:  Negative for difficulty urinating and hematuria.        Positive for nocturia    Musculoskeletal:  Positive for arthralgias, back pain and gait problem.   Skin:  Negative for rash and wound.   Neurological: Negative for speech difficulty, dizziness, lightheadedness.   Hematological:  Does not bruise/bleed easily.   Psychiatric/Behavioral:  Positive for dysphoric mood. Negative for sleep disturbance. The patient is not nervous/anxious.       Objective   /84 (BP Location: Left arm, Patient Position: Sitting, BP Cuff Size: Adult)   Pulse 77   Temp 36.2 °C (97.2 °F) (Temporal)   Resp 18   Ht 1.905 m (6' 3\")   Wt 125 kg (275 lb)   SpO2 100%   BMI 34.37 kg/m²     Physical Exam  Constitutional:       General: He is not in acute distress.     Appearance: Normal appearance. He is obese. He is not toxic-appearing.      Comments: Alert, well-groomed, well-nourished   HENT:      Head: Normocephalic.      Nose: No congestion or rhinorrhea.      Mouth/Throat:      Mouth: Mucous membranes are moist.   Eyes:      General: No scleral icterus.     Extraocular Movements: Extraocular movements intact.      Conjunctiva/sclera: Conjunctivae normal.      Pupils: Pupils are equal, round, and reactive to light.   Cardiovascular:      Rate and Rhythm: Normal rate. Irregular rhythm.       Pulses: Normal pulses.      Heart sounds: No murmur heard.  Pulmonary:      Effort: Pulmonary effort is normal. No respiratory distress.      Breath sounds: No wheezing or rales.   Abdominal:      General: Bowel sounds are normal. There is no distension.      Palpations: Abdomen is soft.      Tenderness: There is no abdominal tenderness. There is no guarding. "   Musculoskeletal:         General: Normal range of motion.      Cervical back: Neck supple.      Right lower leg: No edema.      Left lower leg: No edema.   Skin:     General: Skin is warm and dry.      Capillary Refill: Capillary refill takes less than 2 seconds.      Coloration: Skin is not jaundiced.      Findings: No bruising, erythema or rash  Neurological:      General: No focal deficit present.      Mental Status: He is alert and oriented to person, place, and time.   Clear and coherent speech, MAEx4 with equal strength, 5/5.  Psychiatric:         Mood and Affect: Mood normal.         Behavior: Behavior normal.         Thought Content: Thought content normal.         Judgment: Judgment normal.      Assessment/Plan   Diagnoses and all orders for this visit:  Essential hypertension  -chronic, vitals stable  -continue Losartan 100mg daily, chlorthalidone 25mg daily     Chronic diastolic congestive heart failure  -chronic, stable, euvolemic on exam  -continue Furosemide 20mg daily, Potassium 20meq BID    Atrial flutter, unspecified type (Multi)  -chronic, HR irregular, rate controlled   -dizziness/palpitations resolved at this time  -pt has declined to reestablish with electrophysiology   -continue Eliquis 5mg BID, Metoprolol 100mg BID    Type 2 diabetes mellitus without complication, without long-term current use of insulin  -chronic, managed with Pioglitazone 15mg daily   -unable to tolerate most diabetic medications  -will check A1c today   -encouraged healthy diet habits, increased physical activity     Gastro-esophageal reflux disease without esophagitis  -chronic, stable  -continue Pantoprazole 20mg daily     Depressive disorder  -chronic, mood stable  -continue Bupropion 150mg daily, Duloxetine 60mg daily     Labs obtained in home- CBC, BMP, A1c level- previously ordered during last follow up- pt tolerated well     Advised pt to contact house calls office with any acute concerns or medication needs.           Keyla Nuñez, APRN-CNP

## 2025-06-24 LAB
ANION GAP SERPL CALCULATED.4IONS-SCNC: 13 MMOL/L (CALC) (ref 7–17)
BASOPHILS # BLD AUTO: 29 CELLS/UL (ref 0–200)
BASOPHILS NFR BLD AUTO: 0.5 %
BUN SERPL-MCNC: 21 MG/DL (ref 7–25)
BUN/CREAT SERPL: 16 (CALC) (ref 6–22)
CALCIUM SERPL-MCNC: 9.9 MG/DL (ref 8.6–10.3)
CHLORIDE SERPL-SCNC: 101 MMOL/L (ref 98–110)
CO2 SERPL-SCNC: 28 MMOL/L (ref 20–32)
CREAT SERPL-MCNC: 1.33 MG/DL (ref 0.7–1.28)
EGFRCR SERPLBLD CKD-EPI 2021: 55 ML/MIN/1.73M2
EOSINOPHIL # BLD AUTO: 80 CELLS/UL (ref 15–500)
EOSINOPHIL NFR BLD AUTO: 1.4 %
ERYTHROCYTE [DISTWIDTH] IN BLOOD BY AUTOMATED COUNT: 14.1 % (ref 11–15)
EST. AVERAGE GLUCOSE BLD GHB EST-MCNC: 237 MG/DL
EST. AVERAGE GLUCOSE BLD GHB EST-SCNC: 13.2 MMOL/L
GLUCOSE SERPL-MCNC: 263 MG/DL (ref 65–99)
HBA1C MFR BLD: 9.9 %
HCT VFR BLD AUTO: 49.9 % (ref 38.5–50)
HGB BLD-MCNC: 15.3 G/DL (ref 13.2–17.1)
LYMPHOCYTES # BLD AUTO: 1186 CELLS/UL (ref 850–3900)
LYMPHOCYTES NFR BLD AUTO: 20.8 %
MCH RBC QN AUTO: 29.8 PG (ref 27–33)
MCHC RBC AUTO-ENTMCNC: 30.7 G/DL (ref 32–36)
MCV RBC AUTO: 97.3 FL (ref 80–100)
MONOCYTES # BLD AUTO: 382 CELLS/UL (ref 200–950)
MONOCYTES NFR BLD AUTO: 6.7 %
NEUTROPHILS # BLD AUTO: 4024 CELLS/UL (ref 1500–7800)
NEUTROPHILS NFR BLD AUTO: 70.6 %
PLATELET # BLD AUTO: 163 THOUSAND/UL (ref 140–400)
PMV BLD REES-ECKER: 11.4 FL (ref 7.5–12.5)
POTASSIUM SERPL-SCNC: 4.3 MMOL/L (ref 3.5–5.3)
RBC # BLD AUTO: 5.13 MILLION/UL (ref 4.2–5.8)
SODIUM SERPL-SCNC: 142 MMOL/L (ref 135–146)
WBC # BLD AUTO: 5.7 THOUSAND/UL (ref 3.8–10.8)

## 2025-07-02 DIAGNOSIS — E78.5 HYPERLIPIDEMIA, UNSPECIFIED: ICD-10-CM

## 2025-07-02 RX ORDER — ATORVASTATIN CALCIUM 80 MG/1
80 TABLET, FILM COATED ORAL DAILY
Qty: 90 TABLET | Refills: 3 | Status: SHIPPED | OUTPATIENT
Start: 2025-07-02

## 2025-07-21 DIAGNOSIS — I48.92 ATRIAL FLUTTER, UNSPECIFIED TYPE (MULTI): ICD-10-CM

## 2025-07-21 RX ORDER — DULOXETIN HYDROCHLORIDE 60 MG/1
60 CAPSULE, DELAYED RELEASE ORAL DAILY
Qty: 90 CAPSULE | Refills: 3 | Status: SHIPPED | OUTPATIENT
Start: 2025-07-21

## 2025-08-20 ENCOUNTER — TELEPHONE (OUTPATIENT)
Dept: PRIMARY CARE | Facility: CLINIC | Age: 75
End: 2025-08-20
Payer: MEDICARE

## 2025-08-21 ENCOUNTER — TELEPHONE (OUTPATIENT)
Dept: PRIMARY CARE | Facility: CLINIC | Age: 75
End: 2025-08-21
Payer: MEDICARE

## 2025-08-21 ENCOUNTER — APPOINTMENT (OUTPATIENT)
Dept: PRIMARY CARE | Facility: CLINIC | Age: 75
End: 2025-08-21
Payer: MEDICARE